# Patient Record
Sex: FEMALE | Race: WHITE | NOT HISPANIC OR LATINO | Employment: FULL TIME | URBAN - NONMETROPOLITAN AREA
[De-identification: names, ages, dates, MRNs, and addresses within clinical notes are randomized per-mention and may not be internally consistent; named-entity substitution may affect disease eponyms.]

---

## 2017-03-03 ENCOUNTER — COMMUNICATION - GICH (OUTPATIENT)
Dept: FAMILY MEDICINE | Facility: OTHER | Age: 42
End: 2017-03-03

## 2017-03-03 DIAGNOSIS — L40.9 PSORIASIS: ICD-10-CM

## 2017-04-11 ENCOUNTER — HISTORY (OUTPATIENT)
Dept: FAMILY MEDICINE | Facility: OTHER | Age: 42
End: 2017-04-11

## 2017-04-11 ENCOUNTER — COMMUNICATION - GICH (OUTPATIENT)
Dept: SURGERY | Facility: OTHER | Age: 42
End: 2017-04-11

## 2017-04-11 ENCOUNTER — OFFICE VISIT - GICH (OUTPATIENT)
Dept: FAMILY MEDICINE | Facility: OTHER | Age: 42
End: 2017-04-11

## 2017-04-11 DIAGNOSIS — Z00.00 ENCOUNTER FOR GENERAL ADULT MEDICAL EXAMINATION WITHOUT ABNORMAL FINDINGS: ICD-10-CM

## 2017-04-11 DIAGNOSIS — L40.9 PSORIASIS: ICD-10-CM

## 2017-04-11 DIAGNOSIS — Z80.0 FAMILY HISTORY OF MALIGNANT NEOPLASM OF DIGESTIVE ORGAN: ICD-10-CM

## 2017-04-11 LAB
CHOL/HDL RATIO - HISTORICAL: 2.07
CHOLESTEROL TOTAL: 149 MG/DL
HDLC SERPL-MCNC: 72 MG/DL (ref 23–92)
LDLC SERPL CALC-MCNC: 57 MG/DL
NON-HDL CHOLESTEROL - HISTORICAL: 77 MG/DL
PATIENT STATUS - HISTORICAL: NORMAL
TRIGL SERPL-MCNC: 100 MG/DL

## 2017-04-13 ENCOUNTER — HOSPITAL ENCOUNTER (OUTPATIENT)
Dept: RADIOLOGY | Facility: OTHER | Age: 42
End: 2017-04-13
Attending: FAMILY MEDICINE

## 2017-04-13 DIAGNOSIS — Z00.00 ENCOUNTER FOR GENERAL ADULT MEDICAL EXAMINATION WITHOUT ABNORMAL FINDINGS: ICD-10-CM

## 2017-04-18 ENCOUNTER — HISTORY (OUTPATIENT)
Dept: SURGERY | Facility: OTHER | Age: 42
End: 2017-04-18

## 2017-04-18 ENCOUNTER — OFFICE VISIT - GICH (OUTPATIENT)
Dept: SURGERY | Facility: OTHER | Age: 42
End: 2017-04-18

## 2017-04-18 ENCOUNTER — COMMUNICATION - GICH (OUTPATIENT)
Dept: SURGERY | Facility: OTHER | Age: 42
End: 2017-04-18

## 2017-04-18 DIAGNOSIS — Z80.0 FAMILY HISTORY OF MALIGNANT NEOPLASM OF DIGESTIVE ORGAN: ICD-10-CM

## 2017-04-18 LAB — HPV RESULTS - HISTORICAL: NEGATIVE

## 2017-05-17 ENCOUNTER — HISTORY (OUTPATIENT)
Dept: SURGERY | Facility: OTHER | Age: 42
End: 2017-05-17

## 2017-05-25 ENCOUNTER — SURGERY (OUTPATIENT)
Dept: SURGERY | Facility: OTHER | Age: 42
End: 2017-05-25

## 2017-06-29 ENCOUNTER — SURGERY (OUTPATIENT)
Dept: SURGERY | Facility: OTHER | Age: 42
End: 2017-06-29

## 2017-07-26 ENCOUNTER — COMMUNICATION - GICH (OUTPATIENT)
Dept: FAMILY MEDICINE | Facility: OTHER | Age: 42
End: 2017-07-26

## 2017-07-26 DIAGNOSIS — G44.019 EPISODIC CLUSTER HEADACHE, NOT INTRACTABLE: ICD-10-CM

## 2017-12-28 NOTE — TELEPHONE ENCOUNTER
Patient Information     Patient Name MRN Enid Toribio [id number] Female 1975      Telephone Encounter by Jackie Montalvo at 2017  9:38 AM     Author:  Jackie Montalvo Service:  (none) Author Type:  (none)     Filed:  2017  9:38 AM Encounter Date:  2017 Status:  Signed     :  Jackie Montalvo            Patient notified of rx  Jackie Montalvo LPN..............................2017  9:38 AM

## 2017-12-28 NOTE — TELEPHONE ENCOUNTER
Patient Information     Patient Name MRN Enid Toribio [id number] Female 1975      Telephone Encounter by Talisha King MD at 2017  9:01 AM     Author:  Talisha King MD Service:  (none) Author Type:  Physician     Filed:  2017  9:01 AM Encounter Date:  2017 Status:  Signed     :  Talisha King MD (Physician)            Imitrex prescription is sent.  Talisha King MD

## 2017-12-28 NOTE — TELEPHONE ENCOUNTER
Patient Information     Patient Name MRN Enid Toribio [id number] Female 1975      Telephone Encounter by Estefanía Wetzel at 2017  7:50 AM     Author:  Estefanía Wetzel Service:  (none) Author Type:  (none)     Filed:  2017  7:51 AM Encounter Date:  2017 Status:  Signed     :  Estefanía Wetzel            [facility] CALLED. CLUSTER HA BACK WORSE THAN EVER. WANTS TO SPEAK WITH YOU PRIOR TO SCHEDULING APPT. THANK YOU.  Estefanía Wetzel ....................  2017   7:51 AM

## 2017-12-28 NOTE — TELEPHONE ENCOUNTER
Patient Information     Patient Name MRN Enid Toribio [id number] Female 1975      Telephone Encounter by Jackie Montalvo at 2017  7:54 AM     Author:  Jackie Montalvo Service:  (none) Author Type:  (none)     Filed:  2017  7:57 AM Encounter Date:  2017 Status:  Signed     :  Jackie Montalvo            Patient states she began getting cluster headaches again when on vacation in [place]. Took ibuprofen, drank water when they first started which did help. States they are happening every 3-4 days. Woke up with one today. Had 3 yesterday. Does admit to increased stress. Wanted to reach out in case she should start the imitrex instead? Would rather not keep taking tylenol/ibuprofen every time.   Please advise.  Jackie Montalvo LPN..............................2017  7:57 AM

## 2018-01-03 NOTE — TELEPHONE ENCOUNTER
Patient Information     Patient Name MRN Enid Toribio [id number] Female 1975      Telephone Encounter by Trang Martinez RN at 3/3/2017 10:45 AM     Author:  Trang Martinez RN Service:  (none) Author Type:  NURS- Registered Nurse     Filed:  3/3/2017 11:18 AM Encounter Date:  3/3/2017 Status:  Signed     :  Trang Matrinez RN (NURS- Registered Nurse)            This is a Refill request from: [facility]  Name of Medication: betamethasone dipropionate 0.05% (DIPROLENE) 0.05 % ointment  Quantity requested: 45 grams  Last fill date: 17  Due for refill:   Last visit with TALISHA WHITMORE was on: 2013 in [facility]-over due for OV - letter sent  PCP:  Talisha Whitmore MD  Controlled Substance Agreement:  n/a   Diagnosis r/t this medication request: Psoriasis     Unable to complete prescription refill per RN Medication Refill Policy.................... Trang Martinez RN ....................  3/3/2017   11:14 AM

## 2018-01-04 NOTE — TELEPHONE ENCOUNTER
Patient Information     Patient Name MRN Enid Toribio [id number] Female 1975      Telephone Encounter by Laurie Martell MD at 2017  1:06 PM     Author:  Laurie Martell MD Service:  (none) Author Type:  Physician     Filed:  2017  1:06 PM Encounter Date:  2017 Status:  Signed     :  Laurie Martell MD (Physician)            Please set up consult with me. Thanks! Laurie Martell MD ....................  2017   1:06 PM

## 2018-01-04 NOTE — PROGRESS NOTES
Patient Information     Patient Name MRN Sex Enid Lin [id number] Female 1975      Progress Notes by Laurie Martell MD at 2017  8:20 AM     Author:  Laurie Martell MD Service:  (none) Author Type:  Physician     Filed:  2017 10:35 AM Encounter Date:  2017 Status:  Signed     :  Laurie Martell MD (Physician)            OFFICE CONSULTATION NOTE  Patient Name: Enid Arredondo  Address: [address] [place] [id number]  Age:42 y.o.  Sex: female     Primary Care Physician: Talisha King MD    I was requested to see this patient in consultation by Talisha King MD for because of a family history of colon cancer and colon poyps. A copy of this note will be sent to Talisha King MD.    HPI:   The patient is 42 y.o. female with questions about need for screening colonoscopy. She hasn't had any abdominal pain or changes in stools. The patient denies nausea, vomiting, constipation and diarrhea. No problems with urinating. She had a colonoscopy in  with no polyps noted. Her maternal grand mother had colon cancer. Her mother and sister (3 years older) have had precancerous polyps, but not cancer.    CONSULTATION ASSESSMENT AND PLAN/RECOMMENDATIONS:   I explained to the patient the risks, benefits and alternatives to screening colonoscopy for evaluating the colon in a patient with family history of colon cancer and colon polyps. We specifically discussed the risks of bleeding, infection, perforation, potential inability to reach the cecum and the risks of sedation. The patient's questions were answered and the patient wished to proceed.     REVIEW OF SYSTEMS  GENERAL: No fevers or chills. Denies fatigue, recent weight loss.  HEENT: No sinus drainage. No changes with vision or hearing. No difficulty swallowing.   LYMPHATICS:  No swollen nodes in axilla, neck or groin.  CARDIOVASCULAR: Denies chest pain, palpitations and dyspnea on  exertion.  PULMONARY: No shortness of breath or cough. No increase in sputum production.  GI: Denies melena, bright red blood in stools. No hematemesis. No constipation or diarrhea.  : No dysuria or hematuria.  SKIN: No recent rashes or ulcers.   HEMATOLOGY:  No history of easy bruising or bleeding.  ENDOCRINE:  No history of diabetes or thyroid problems.  NEUROLOGY:  No history of seizures or headaches. No motor or sensory changes.  PAST MEDICAL HISTORY  Past Medical History:     Diagnosis  Date     Cluster headaches      Hx of delivery     G4, para 3013 history of SAB 2006.       Psoriasis       PAST SURGICAL HISTORY  Past Surgical History:      Procedure  Laterality Date     COLONOSCOPY SCREENING  2010    normal, due 2020       REFRACTIVE SURGERY  2007    Lasik eye surgery        CURRENT MEDS  Current Outpatient Prescriptions on File Prior to Visit       Medication  Sig Dispense Refill     betamethasone dipropionate 0.05% (DIPROLENE) 0.05 % ointment Apply  topically to affected area(s) 2 times daily. 45 g 11     SUMAtriptan NASAL (IMITREX) 20 mg/actuation nasal spray Inhale 1 Spray in the nostril(s) every 2 hours if needed for Migraine. Max dose: 40mg per 24 hrs. 1 Bottle 5     No current facility-administered medications on file prior to visit.      ALLERGIES/SENSITIVITIES  No Known Allergies  FAMILY HISTORY  Family History       Problem   Relation Age of Onset     Cancer-colon  Maternal Grandmother      Colon cancer       Other  Maternal Grandmother      Alzheimer's       Cancer  Paternal Grandmother      Liver cancer       No Known Problems  Daughter      Good Health  Son      Colon polyps  Sister 34     Colon polyps diagnosed       Good Health  Other      Good Health  Daughter      Hypertension  Mother      Lives in [place]       Benign prostatic hyperplasia  Father      Lives in [place]       No Known Problems  Brother      1/2 sibling       No Known Problems  Brother      1/2 sibling       No Known  "Problems  Brother        - MVA        SOCIAL HISTORY  Social History     Social History        Marital status:       Spouse name: Brad     Number of children:  3     Years of education:  16     Occupational History       DAY CARE      Social History Main Topics         Smoking status:   Never Smoker     Smokeless tobacco:   Never Used     Alcohol use   No     Drug use:   No     Sexual activity:   Yes     Partners:  Male     Birth control/ protection:  Surgical      Comment: vas      Other Topics  Concern     Not on file      Social History Narrative     .  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      Brad  Spouse     Fariha  Daughter born 2009    Tariq Son born     Allison daughter               The above history was reviewed today.    PHYSICAL EXAM  /80  Ht 1.651 m (5' 5\")  Wt 73.6 kg (162 lb 4 oz)  LMP 2017  BMI 27 kg/m2    GENERAL: Healthy appearing patient in no acute distress. Pleasant and cooperative with exam and interview.   HEENT: Head-normocephalic. Eyes-no scleral icterus, pupils equal, round, and reactive to light. Nose-no nasal drainage. No lesions. Mouth-oral mucosa pink and moist, no lesions.  NECK: Supple. No thyroid nodules. Trachea midline.  LYMPHATICS:  No cervical, axillary or supraclavicular adenopathy.  CV: Regular rate and rhythm, no murmurs. No peripheral edema.  LUNGS:  No respiratory distress. Clear bilaterally to auscultation.  ABDOMEN: Non distended. Bowel sounds active. Soft, non-tender, no hepatosplenomegaly or umbilical hernia. No peritoneal signs.  SKIN: Pink, warm and dry. No jaundice. No rash.  NEURO:  Cranial nerves II-XII grossly intact. Alert and oriented.  PSYCH: Appropriate mood and affect.      Laurie Martell MD           "

## 2018-01-04 NOTE — NURSING NOTE
Patient Information     Patient Name MRN Enid Toribio [id number] Female 1975      Nursing Note by Claudette Kirk at 2017  8:20 AM     Author:  Claudette Kirk Service:  (none) Author Type:  (none)     Filed:  2017  8:40 AM Encounter Date:  2017 Status:  Signed     :  Claudette Kirk            Here today in consultation to discuss a colonoscopy.  Claudette Kirk LPN..........2017  8:37 AM

## 2018-01-04 NOTE — NURSING NOTE
Patient Information     Patient Name MRN Enid Toribio [id number] Female 1975      Nursing Note by Jackie Montalvo at 2017  8:45 AM     Author:  Jackie Montalvo Service:  (none) Author Type:  (none)     Filed:  2017  9:03 AM Encounter Date:  2017 Status:  Signed     :  Jackie Montalvo            Patient here for yearly physical. Wanting to discuss mammogram, colonoscopy and psoriasis.  Jackie Montalvo LPN..............................2017  8:56 AM

## 2018-01-04 NOTE — ADDENDUM NOTE
Patient Information     Patient Name MRN Sex Enid Lin [id number] Female 1975      Addendum Note by Laurie Mcleod at 2017  1:02 PM     Author:  Laurie Mcleod Service:  (none) Author Type:  (none)     Filed:  2017  1:02 PM Encounter Date:  2017 Status:  Signed     :  Laurie Mcleod       Addended by: LAURIE MCLEOD on: 2017 01:02 PM        Modules accepted: Orders

## 2018-01-04 NOTE — PROGRESS NOTES
Patient Information     Patient Name MRN Sex Enid Lin [id number] Female 1975      Progress Notes by Talisha King MD at 2017  8:45 AM     Author:  Talisha King MD Service:  (none) Author Type:  Physician     Filed:  2017 12:17 PM Encounter Date:  2017 Status:  Signed     :  Talisha King MD (Physician)            SUBJECTIVE:    Enid Arredondo is a 42 y.o. female who presehts for her annual exam.    HPI: Enid Arredondo is a 42 y.o. female presents for her annual exam.  She has psoriasis that involves her extremities and scalp. Currently, is being treated with topical steroid cream. She has been some tanning for this. She is wondering what her other treatment options could be, even though she is somewhat leery of using Biologics.    Last pap:   Immunizations:  Up to date   Mammogram - baseline to be scheduled  Colon cancer screening due to family history of colon polyps and colon cancer, she did have a colonoscopy done in .  Current birth control vasectomy   Last Lipids:  None on file.    PROBLEM LIST:  Patient Active Problem List     Diagnosis  Code     EPISODIC CLUSTER HEADACHE G44.019     PSORIASIS L40.9     NEVUS, ATYPICAL D23.9     Family history of colon cancer Z80.0     PAST MEDICAL HISTORY:  Past Medical History:     Diagnosis  Date     Cluster headaches      Hx of delivery     G4, para 3013 history of SAB 2006.       Psoriasis      SURGICAL HISTORY:  Past Surgical History:      Procedure  Laterality Date     COLONOSCOPY SCREENING      normal, due 2020       REFRACTIVE SURGERY      Lasik eye surgery         SOCIAL HISTORY:  Social History     Social History        Marital status:       Spouse name: Brad     Number of children:  3     Years of education:  16     Occupational History       [employer]ARE      Social History Main Topics         Smoking status:   Never Smoker     Smokeless tobacco:    Never Used     Alcohol use   No     Drug use:   No     Sexual activity:   Yes     Partners:  Male     Birth control/ protection:  Surgical      Comment: vas      Other Topics  Concern     Not on file      Social History Narrative     .  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      Brad  Spouse     Fariha  Daughter born 2009    Tariq Son born     Allison daughter 1-             FAMILY HISTORY:  Family History       Problem   Relation Age of Onset     Cancer-colon  Maternal Grandmother      Colon cancer       Other  Maternal Grandmother      Alzheimer's       Cancer  Paternal Grandmother      Liver cancer       No Known Problems  Daughter      Good Health  Son      Colon polyps  Sister 34     Colon polyps diagnosed       Good Health  Other      Good Health  Daughter      Hypertension  Mother      Lives in [place]       Benign prostatic hyperplasia  Father      Lives in [place]       No Known Problems  Brother      1/2 sibling       No Known Problems  Brother      1/2 sibling       No Known Problems  Brother        - MVA        CURRENT MEDICATIONS:   Current Outpatient Prescriptions       Medication  Sig Dispense Refill     betamethasone dipropionate 0.05% (DIPROLENE) 0.05 % ointment APPLY A THIN LAYER TO THE AFFECTED AREA TWICE DAILY AS NEEDED 45 g 0     mometasone (NASONEX) (50 mcg each actuation) nasal spray Inhale 2 Sprays into both nostrils once daily. 1 canister 4     SUMAtriptan NASAL (IMITREX) 20 mg/actuation nasal spray Inhale 1 Spray in the nostril(s) every 2 hours if needed for Migraine. Max dose: 40mg per 24 hrs. 1 Bottle 5     No current facility-administered medications for this visit.      Medications have been reviewed by me and are current to the best of my knowledge and ability.    ALLERGIES:  Review of patient's allergies indicates no known allergies.     REVIEW OF SYSTEMS:  General: denies any general problems.  Eyes: denies  "problems  Ears/Nose/Throat: denies problems, last dentist visit was last fall  Respiratory: denies problems  Cardiovascular: denies problems  Gastrointestinal: denies problems  Genitourinary: denies problems  Musculoskeletal: denies problems  Skin: Psoriasis  Neurologic: denies problems  Psychiatric: no headaches   Endocrine: denies problems  Heme/Lymphatic: denies problems  Allergic/Immunologic: denies problems    PHQ Depression Screening 4/11/2017   Date of PHQ exam (doc flow) 4/11/2017   1. Lack of interest/pleasure 0 - Not at all   2. Feeling down/depressed 0 - Not at all   PHQ-2 TOTAL SCORE 0      OBJECTIVE:  /80  Pulse 72  Ht 1.626 m (5' 4\")  Wt 73.3 kg (161 lb 9.6 oz)  LMP 03/27/2017  BMI 27.74 kg/m2  EXAM:   General Appearance: Pleasant, alert, appropriate appearance for age. No acute distress  Head Exam: Normal. Normocephalic, atraumatic.  Eye Exam:  Normal external eye, conjunctiva, lids, cornea. GABRIEL.  Ear Exam: Normal TM's bilaterally. Normal auditory canals and external ears. Non-tender.  Nose Exam: Normal external nose, mucus membranes, and septum.  OroPharynx Exam:  Dental hygiene adequate. Normal buccal mucose. Normal pharynx.  Neck Exam:  Supple, no masses or nodes.  Thyroid Exam: No nodules or enlargement.  Chest/Respiratory Exam: Normal chest wall and respirations. Clear to auscultation.  Breast Exam: No dimpling, nipple retraction or discharge. No masses or nodes.  Cardiovascular Exam: Regular rate and rhythm. S1, S2, no murmur, click, gallop, or rubs.  Gastrointestinal Exam: Soft, non-tender, no masses or organomegaly  Genitourinary Exam Female: External genitalia, vulva and vagina appear normal. Bimanual exam reveals normal uterus and adnexa, nontender urethra and bladder. Pap smear obtained   Lymphatic Exam: Non-palpable nodes in neck, clavicular, axillary, or inguinal regions.  Musculoskeletal Exam: Back is straight and non-tender, full ROM of upper and lower extremities.  Foot " Exam: Left and right foot: good pedal pulses, no lesions, nail hygiene good.  Skin: Erythema with scaling plaque on her elbows, knees, and up in her scalp.  Neurologic Exam: Nonfocal, symmetric DTRs, normal gross motor, tone coordination and no tremor.  Psychiatric Exam: Alert and oriented - appropriate affect.    Results for orders placed or performed in visit on 04/11/17       LIPID PANEL       Result  Value Ref Range Status    CHOLESTEROL,TOTAL 149 <200 mg/dL Final    TRIGLYCERIDES 100 <150 mg/dL Final    HDL CHOLESTEROL 72 23 - 92 mg/dL Final    NON-HDL CHOLESTEROL 77 <145 mg/dl Final    CHOL/HDL RATIO            2.07 <4.50                 Final    LDL CHOLESTEROL 57 <100 mg/dL Final    PATIENT STATUS            NOT GIVEN                 Final       ASSESSMENT/PLAN    ICD-10-CM    1. Physical exam, annual Z00.00 GYN THIN PREP PAP SCREEN IMAGED      XR MAMMO BILAT SCREENING      LIPID PANEL      LIPID PANEL      GYN THIN PREP PAP SCREEN IMAGED   2. Psoriasis L40.9 betamethasone dipropionate 0.05% (DIPROLENE) 0.05 % ointment      AMB CONSULT TO DERMATOLOGY   3. Family history of colon cancer Z80.0 COLONOSCOPY SCREENING-[facility]     Ms. Jaramillo There is no height or weight on file to calculate BMI. This is out of the normal range for a 42 y.o. Normal range for ages 18-64 is between 18.5 and 24.9; normal range for ages 65+ is 23-30. To lose weight we reviewed risks and benefits of appropriate options such as diet, exercise, and medications. Patient's strategy will be  self-directed nutrition plan and self-directed exercise program  BP Readings from Last 1 Encounters:05/12/15 : 120/78  Ms. Jaramillo blood pressure is out of the normal range for adults. Per JNC-8 guidelines normal adult blood pressure is < 120/80, pre-hypertensive is between 120/80 and 139/89, and hypertension is 140/90 or greater. Risks of hypertension were discussed. Patient's strategy will be to recheck blood pressure in 12 months    1. Pap  smear with HPV to be obtained today.  2. Baseline mammogram is ordered.  3. Referral for follow-up screening colonoscopy due to high risk family factors.  4. Normal lipid panel.  5. Dermatology consultation to be sent. This should include a thorough discussion of risks versus benefits of new or treatments for psoriasis    Talisha King MD

## 2018-01-04 NOTE — TELEPHONE ENCOUNTER
Patient Information     Patient Name MRN Enid Toribio [id number] Female 1975      Telephone Encounter by Louise Lopez at 2017 11:30 AM     Author:  Louise Lopez Service:  (none) Author Type:  (none)     Filed:  2017 11:33 AM Encounter Date:  2017 Status:  Signed     :  Louise Lopez            Patient referred by Dr. King for a screening colonoscopy .  She is 42 years old and has a family history of colon cancer . Please advise.  Thank you

## 2018-01-04 NOTE — H&P
Patient Information     Patient Name MRN Sex Enid Lin [id number] Female 1975      H&P by Laurie Martell MD at 2017  8:20 AM     Author:  Laurie Martell MD Service:  (none) Author Type:  Physician     Filed:  2017 10:35 AM Encounter Date:  2017 Status:  Signed     :  Laurie Martell MD (Physician)            OFFICE CONSULTATION NOTE  Patient Name: Enid Arredondo  Address: [address] [place] [id number]  Age:42 y.o.  Sex: female     Primary Care Physician: Talisha King MD    I was requested to see this patient in consultation by Talisha King MD for because of a family history of colon cancer and colon poyps. A copy of this note will be sent to Talisha King MD.    HPI:   The patient is 42 y.o. female with questions about need for screening colonoscopy. She hasn't had any abdominal pain or changes in stools. The patient denies nausea, vomiting, constipation and diarrhea. No problems with urinating. She had a colonoscopy in  with no polyps noted. Her maternal grand mother had colon cancer. Her mother and sister (3 years older) have had precancerous polyps, but not cancer.    CONSULTATION ASSESSMENT AND PLAN/RECOMMENDATIONS:   I explained to the patient the risks, benefits and alternatives to screening colonoscopy for evaluating the colon in a patient with family history of colon cancer and colon polyps. We specifically discussed the risks of bleeding, infection, perforation, potential inability to reach the cecum and the risks of sedation. The patient's questions were answered and the patient wished to proceed.     REVIEW OF SYSTEMS  GENERAL: No fevers or chills. Denies fatigue, recent weight loss.  HEENT: No sinus drainage. No changes with vision or hearing. No difficulty swallowing.   LYMPHATICS:  No swollen nodes in axilla, neck or groin.  CARDIOVASCULAR: Denies chest pain, palpitations and dyspnea on  exertion.  PULMONARY: No shortness of breath or cough. No increase in sputum production.  GI: Denies melena, bright red blood in stools. No hematemesis. No constipation or diarrhea.  : No dysuria or hematuria.  SKIN: No recent rashes or ulcers.   HEMATOLOGY:  No history of easy bruising or bleeding.  ENDOCRINE:  No history of diabetes or thyroid problems.  NEUROLOGY:  No history of seizures or headaches. No motor or sensory changes.  PAST MEDICAL HISTORY  Past Medical History:     Diagnosis  Date     Cluster headaches      Hx of delivery     G4, para 3013 history of SAB 2006.       Psoriasis       PAST SURGICAL HISTORY  Past Surgical History:      Procedure  Laterality Date     COLONOSCOPY SCREENING  2010    normal, due 2020       REFRACTIVE SURGERY  2007    Lasik eye surgery        CURRENT MEDS  Current Outpatient Prescriptions on File Prior to Visit       Medication  Sig Dispense Refill     betamethasone dipropionate 0.05% (DIPROLENE) 0.05 % ointment Apply  topically to affected area(s) 2 times daily. 45 g 11     SUMAtriptan NASAL (IMITREX) 20 mg/actuation nasal spray Inhale 1 Spray in the nostril(s) every 2 hours if needed for Migraine. Max dose: 40mg per 24 hrs. 1 Bottle 5     No current facility-administered medications on file prior to visit.      ALLERGIES/SENSITIVITIES  No Known Allergies  FAMILY HISTORY  Family History       Problem   Relation Age of Onset     Cancer-colon  Maternal Grandmother      Colon cancer       Other  Maternal Grandmother      Alzheimer's       Cancer  Paternal Grandmother      Liver cancer       No Known Problems  Daughter      Good Health  Son      Colon polyps  Sister 34     Colon polyps diagnosed       Good Health  Other      Good Health  Daughter      Hypertension  Mother      Lives in [place]       Benign prostatic hyperplasia  Father      Lives in [place]       No Known Problems  Brother      1/2 sibling       No Known Problems  Brother      1/2 sibling       No Known  "Problems  Brother        - MVA        SOCIAL HISTORY  Social History     Social History        Marital status:       Spouse name: Brad     Number of children:  3     Years of education:  16     Occupational History       [employer]      Social History Main Topics         Smoking status:   Never Smoker     Smokeless tobacco:   Never Used     Alcohol use   No     Drug use:   No     Sexual activity:   Yes     Partners:  Male     Birth control/ protection:  Surgical      Comment: vas      Other Topics  Concern     Not on file      Social History Narrative     .  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      Brad  Spouse     Fariha  Daughter born 2009    Tariq Son born     Allison daughter               The above history was reviewed today.    PHYSICAL EXAM  /80  Ht 1.651 m (5' 5\")  Wt 73.6 kg (162 lb 4 oz)  LMP 2017  BMI 27 kg/m2    GENERAL: Healthy appearing patient in no acute distress. Pleasant and cooperative with exam and interview.   HEENT: Head-normocephalic. Eyes-no scleral icterus, pupils equal, round, and reactive to light. Nose-no nasal drainage. No lesions. Mouth-oral mucosa pink and moist, no lesions.  NECK: Supple. No thyroid nodules. Trachea midline.  LYMPHATICS:  No cervical, axillary or supraclavicular adenopathy.  CV: Regular rate and rhythm, no murmurs. No peripheral edema.  LUNGS:  No respiratory distress. Clear bilaterally to auscultation.  ABDOMEN: Non distended. Bowel sounds active. Soft, non-tender, no hepatosplenomegaly or umbilical hernia. No peritoneal signs.  SKIN: Pink, warm and dry. No jaundice. No rash.  NEURO:  Cranial nerves II-XII grossly intact. Alert and oriented.  PSYCH: Appropriate mood and affect.      Laurie Martell MD           "

## 2018-01-04 NOTE — TELEPHONE ENCOUNTER
Patient Information     Patient Name MRN Enid Toribio [id number] Female 1975      Telephone Encounter by Louise Lopez at 2017  9:07 AM     Author:  Louise Lopez Service:  (none) Author Type:  (none)     Filed:  2017  9:12 AM Encounter Date:  2017 Status:  Signed     :  Louise Lopez            Screening Questions for the Scheduling of Screening Colonoscopies   (If Colonoscopy is diagnostic, Provider should review the chart before scheduling.)  Are you younger than 50 or older than 80?  YES   Do you take aspirin or fish oil?  NO (if yes, tell patient to stop 1 week prior to Colonoscopy)  Do you take warfarin (Coumadin), clopidogrel (Plavix), apixaban (Eliquis), dabigatram (Pradaxa), rivaroxaban (Xarelto) or any blood thinner? NO   Do you use oxygen at home?  NO   Do you have kidney disease? NO  Are you on dialysis? NO   Have you had a stroke or heart attack in the last year? NO  Have you had a stent in your heart or any blood vessel in the last year? NO   Have you had a transplant of any organ? NO  Have you had a colonoscopy or upper endoscopy (EGD) before? YES           When?    -  [facility]   Date of scheduled Colonoscopy. 2017  Provider [place]   [facility]  ([facility] )

## 2018-01-24 ENCOUNTER — DOCUMENTATION ONLY (OUTPATIENT)
Dept: FAMILY MEDICINE | Facility: OTHER | Age: 43
End: 2018-01-24

## 2018-01-24 PROBLEM — Z80.0 FAMILY HISTORY OF COLON CANCER: Status: ACTIVE | Noted: 2017-04-11

## 2018-01-24 RX ORDER — SUMATRIPTAN 20 MG/1
20 SPRAY NASAL
COMMUNITY
Start: 2017-07-26 | End: 2020-01-14

## 2018-01-24 RX ORDER — BETAMETHASONE DIPROPIONATE 0.5 MG/G
OINTMENT, AUGMENTED TOPICAL
COMMUNITY
Start: 2017-04-11 | End: 2018-05-16

## 2018-01-27 VITALS
SYSTOLIC BLOOD PRESSURE: 120 MMHG | HEART RATE: 72 BPM | BODY MASS INDEX: 27.59 KG/M2 | HEIGHT: 64 IN | WEIGHT: 161.6 LBS | DIASTOLIC BLOOD PRESSURE: 80 MMHG

## 2018-01-27 VITALS
WEIGHT: 162.25 LBS | BODY MASS INDEX: 27.03 KG/M2 | DIASTOLIC BLOOD PRESSURE: 80 MMHG | SYSTOLIC BLOOD PRESSURE: 120 MMHG | HEIGHT: 65 IN

## 2018-05-16 DIAGNOSIS — L40.9 PSORIASIS: Primary | ICD-10-CM

## 2018-05-16 NOTE — LETTER
May 22, 2018      Enid Arredondo  [address] [address]        Dear Enid,     This letter is to remind you that you are due for your annual exam with Talisha Hill. Your last comprehensive visit was more than 12 months ago.    A LIMITED refill of     betamethasone dipropionate 0.05% (DIPROLENE) 0.05 % ointment    has been called into your pharmacy. Additional refills require you to complete this appointment.    Please call the clinic at [phone] to schedule your appointment.    If you should require additional refills before your scheduled appointment, please contact your pharmacy and we will refill your medication until the date of your appointment.    If you are no longer seeing Talisha Hill for primary care, please call to let us know. Doing so will remove you from our call/contact list.      Thank you for choosing [facility] and [facility] for your health care needs.    Sincerely,    Refill MAINE  [facility]

## 2018-05-22 RX ORDER — BETAMETHASONE DIPROPIONATE 0.5 MG/G
OINTMENT, AUGMENTED TOPICAL
Qty: 45 G | Refills: 2 | Status: SHIPPED | OUTPATIENT
Start: 2018-05-22 | End: 2018-08-22

## 2018-05-22 NOTE — TELEPHONE ENCOUNTER
Last OV 4/11/17. Patient is due to be seen. Reminder letter sent to Patient. Prescription approved per [facility] Refill Protocol for 90-day fiona period at this time.     Ebony Duke RN .............. 5/22/2018  10:54 AM

## 2018-07-23 NOTE — PROGRESS NOTES
Patient Information     Patient Name  Enid Arredondo MRN  [id number] Sex  Female   1975      Letter by Talisha Sood MD at      Author:  Talisha Sood MD Service:  (none) Author Type:  (none)    Filed:   Encounter Date:  2017 Status:  (Other)           Enid Arredondo  [address] [address]          2017    Dear Ms. Arredondo:    Your pap smear and HPV results are normal.  Your next pap smear is due in 5 years.    Sincerely,  Talisha King MD [facility] 2017 10:25 AM

## 2018-07-24 NOTE — PROGRESS NOTES
Patient Information     Patient Name  Enid Arredondo MRN  [id number] Sex  Female   1975      Letter by Talisha Sood MD at      Author:  Talisha Sood MD Service:  (none) Author Type:  (none)    Filed:   Encounter Date:  2017 Status:  (Other)           Enid Arredondo  [address] [address]          2017    Dear Ms. Arredondo:    Here's a copy of your cholesterol panel:  Results for orders placed or performed in visit on 17      LIPID PANEL      Result  Value Ref Range    CHOLESTEROL,TOTAL 149 <200 mg/dL    TRIGLYCERIDES 100 <150 mg/dL    HDL CHOLESTEROL 72 23 - 92 mg/dL    NON-HDL CHOLESTEROL 77 <145 mg/dl    CHOL/HDL RATIO            2.07 <4.50                    LDL CHOLESTEROL 57 <100 mg/dL    PATIENT STATUS            NOT GIVEN                     These results are very good/normal.    Sincerely,  Talisha King MD [facility] 2017 4:13 PM

## 2018-08-22 ENCOUNTER — OFFICE VISIT (OUTPATIENT)
Dept: FAMILY MEDICINE | Facility: OTHER | Age: 43
End: 2018-08-22
Attending: FAMILY MEDICINE
Payer: COMMERCIAL

## 2018-08-22 VITALS
SYSTOLIC BLOOD PRESSURE: 118 MMHG | HEIGHT: 65 IN | DIASTOLIC BLOOD PRESSURE: 80 MMHG | WEIGHT: 163.4 LBS | BODY MASS INDEX: 27.22 KG/M2 | HEART RATE: 72 BPM

## 2018-08-22 DIAGNOSIS — Z12.31 ENCOUNTER FOR SCREENING MAMMOGRAM FOR BREAST CANCER: ICD-10-CM

## 2018-08-22 DIAGNOSIS — L40.9 PSORIASIS: ICD-10-CM

## 2018-08-22 DIAGNOSIS — Z80.0 FAMILY HISTORY OF COLON CANCER: ICD-10-CM

## 2018-08-22 DIAGNOSIS — Z00.00 PHYSICAL EXAM, ANNUAL: Primary | ICD-10-CM

## 2018-08-22 PROCEDURE — 99396 PREV VISIT EST AGE 40-64: CPT | Performed by: FAMILY MEDICINE

## 2018-08-22 RX ORDER — BETAMETHASONE DIPROPIONATE 0.5 MG/G
OINTMENT, AUGMENTED TOPICAL
Qty: 45 G | Refills: 11 | Status: SHIPPED | OUTPATIENT
Start: 2018-08-22 | End: 2020-01-14

## 2018-08-22 ASSESSMENT — PAIN SCALES - GENERAL: PAINLEVEL: NO PAIN (0)

## 2018-08-22 NOTE — PROGRESS NOTES
SUBJECTIVE:    Enid Arredondo is a 43 year old female who presents for her annual exam.    HPI: Enid Arredondo is a 43 year old female presents for her annual exam.    Last pap: 2017  Immunizations:  Due for tetanus  Mammogram at age 47  Colon cancer screening at age 47  Current birth control vasectomy     No results found for: CHOL  HDL Cholesterol   Date Value Ref Range Status   04/11/2017 72 23 - 92 mg/dL      LDL Cholesterol Calculated   Date Value Ref Range Status   04/11/2017 57 <100 mg/dL      Triglycerides   Date Value Ref Range Status   04/11/2017 100 <150 mg/dL            PROBLEM LIST:  Patient Active Problem List   Diagnosis     Episodic cluster headache     Family history of colon cancer     Psoriasis       PAST MEDICAL HISTORY:  Past Medical History:   Diagnosis Date     Cluster headache syndrome, not intractable     No Comments Provided     Psoriasis     No Comments Provided     Vaginal delivery     G4, para 3013 history of SAB 2006.     SURGICAL HISTORY:  Past Surgical History:   Procedure Laterality Date     COLONOSCOPY      2010,normal, due 2020     ENHANCE LASER REFRACTIVE NEW PT IN PARAMETERS      2007,Lasik eye surgery       SOCIAL HISTORY:  Social History     Social History     Marital status:      Spouse name: Brad     Number of children: 3     Years of education: 16     Occupational History      [employer]     Social History Main Topics     Smoking status: Never Smoker     Smokeless tobacco: Never Used     Alcohol use No     Drug use: No     Sexual activity: Yes     Partners: Male     Birth control/ protection: Surgical      Comment: Birth control method: vas     Other Topics Concern     Not on file     Social History Narrative    .  Runs in home  - closing summer 2017   works for [employer].    Brad  Spouse   Fariha  Daughter born 05/2009  Tariq Son born 2011  Allison daughter 1-2013     FAMILY HISTORY:    Family History   Problem  Relation Age of Onset     Colon Cancer Maternal Grandmother      Cancer-colon,Colon cancer     Other - See Comments Maternal Grandmother      Alzheimer's     Cancer Paternal Grandmother      Cancer,Liver cancer     No Known Problems Daughter      No Known Problems Son      No Known Problems Daughter      Hypertension Mother      Hypertension,Lives in [place]     Other - See Comments Father      Benign prostatic hyperplasia,Lives in [place]     Colon Polyps Sister 34     Colon polyps,Colon polyps diagnosed     No Known Problems Paternal Half-Brother      No Known Problems Brother      Other - See Comments Brother      No Known Problems,  - MVA       CURRENT MEDICATIONS:   Current Outpatient Prescriptions   Medication Sig Dispense Refill     augmented betamethasone dipropionate (DIPROLENE-AF) 0.05 % ointment APPLY TOPICALLY TO THE AFFECTED AREAS 2 TIMES DAILY 45 g 11     SUMAtriptan (IMITREX) 20 MG/ACT nasal spray Spray 20 mg in nostril every 2 hours as needed       ALLERGIES:   No Known Allergies      REVIEW OF SYSTEMS:  General: denies any general problems.  Eyes: denies problems  Ears/Nose/Throat: denies problems, last dentist visit was within the past year  Respiratory: denies problems  Cardiovascular: denies problems  Gastrointestinal: denies problems  Genitourinary: denies problems - periods regular  Musculoskeletal: denies problems  Skin: psoriasis, previous benign mole removal   Neurologic: did not get her cluster headaches this year  Psychiatric: denies problems  Endocrine: denies problems  Heme/Lymphatic: denies problems  Allergic/Immunologic: some increased seasonal allergies ; had some type of rash after [place]/tubing/chlorine    PHQ-2 Score:     PHQ-2 (  [employer]) 2018   Q1: Little interest or pleasure in doing things 0   Q2: Feeling down, depressed or hopeless 0   PHQ-2 Score 0         OBJECTIVE:  /80 (BP Location: Right arm, Patient Position: Sitting, Cuff Size: Adult Regular)   "Pulse 72  Ht 5' 4.5\" (1.638 m)  Wt 163 lb 6.4 oz (74.1 kg)  LMP 08/19/2018  BMI 27.61 kg/m2   EXAM:   General Appearance: Pleasant, alert, appropriate appearance for age. No acute distress  Head Exam: Normal. Normocephalic, atraumatic.  Eye Exam:Normal external eye, conjunctiva, lids, cornea. GABRIEL.  Ear Exam: Normal TM's bilaterally. Normal auditory canals and external ears. Non-tender.  Nose Exam: Normal external nose, mucus membranes, and septum.  OroPharynx Exam:  Dental hygiene adequate. Normal buccal mucose. Normal pharynx.  Neck Exam:  Supple, no masses or nodes.  Thyroid Exam: No nodules or enlargement.  Chest/Respiratory Exam: Normal chest wall andrespirations. Clear to auscultation.  Breast Exam: No dimpling, nipple retraction or discharge. No masses or nodes.  Cardiovascular Exam: Regular rate and rhythm. S1, S2, no murmur, click, gallop, or rubs.  Gastrointestinal Exam: Soft, non-tender, no masses or organomegaly; gas bubble right LQ  Musculoskeletal Exam: Back is straight and non-tender, full ROM of upper and lower extremities.  Foot Exam: Left andright foot: good pedal pulses, no lesions, nail hygiene good.  Skin: psoriasis, elbows and knees   Neurologic Exam: Nonfocal, symmetric DTRs, normal gross motor, tone coordination and no tremor.  Psychiatric Exam: Alertand oriented - appropriate affect.    Office Visit - [facility] on 04/11/2017   Component Date Value Ref Range Status     Cholesterol Total 04/11/2017 149  <200 mg/dL Final     Triglycerides 04/11/2017 100  <150 mg/dL Final     LDL Cholesterol Calculated 04/11/2017 57  <100 mg/dL Final     HDL Cholesterol 04/11/2017 72  23 - 92 mg/dL Final     Non-HDL Cholesterol - Historical 04/11/2017 77  <145 mg/dL Final     Cholesterol/HDL Ratio - Historical 04/11/2017 2.07  <4.50 Final     Patient Status - Historical 04/11/2017 NOT GIVEN   Final     HPV Results - [facility] Historical 04/18/2017 Negative  Negative Final    HPV types 16, 18, 31, 33, 35, 39, 45, 51, " 52, 56, 58, 59, 66 and 68 DNA were undetectable or below the pre-set threshold.       ASSESSMENT/PLAN    ICD-10-CM    1. Physical exam, annual Z00.00    2. Psoriasis L40.9 augmented betamethasone dipropionate (DIPROLENE-AF) 0.05 % ointment   3. Family history of colon cancer Z80.0    4. Encounter for screening mammogram for breast cancer Z12.31 MA Screen Bilateral w/Shawn     1.  Mammogram ordered.  2.  Colonoscopy at 47 -she did have a colonoscopy done in 2010, this was normal, no polyps noted at that time.  3.  Diprolene re-ordered   -discussed dermatology referral if/when she wishes to discuss biologic medication for psoriasis.  4.  Flu vaccine recommended this fall.      Talisha King MD

## 2018-08-22 NOTE — NURSING NOTE
"Patient presents in the clinic for a physical, no noted concerns at this time.   Lisy Mills LPN 8/22/2018 1:22 PM  Chief Complaint   Patient presents with     Physical       Initial /80 (BP Location: Right arm, Patient Position: Sitting, Cuff Size: Adult Regular)  Pulse 72  Ht 5' 4.5\" (1.638 m)  Wt 163 lb 6.4 oz (74.1 kg)  LMP 08/19/2018  BMI 27.61 kg/m2 Estimated body mass index is 27.61 kg/(m^2) as calculated from the following:    Height as of this encounter: 5' 4.5\" (1.638 m).    Weight as of this encounter: 163 lb 6.4 oz (74.1 kg).  Medication Reconciliation: complete    Jade Mills LPN    "

## 2018-08-22 NOTE — MR AVS SNAPSHOT
"              After Visit Summary   8/22/2018    Enid Arredondo    MRN: [id number]           Patient Information     Date Of Birth          1975        Visit Information        Provider Department      8/22/2018 1:45 PM Talisha Hill MD [facility]        Today's Diagnoses     Physical exam, annual    -  1    Psoriasis        Family history of colon cancer        Encounter for screening mammogram for breast cancer           Follow-ups after your visit        Future tests that were ordered for you today     Open Future Orders        Priority Expected Expires Ordered    MA Screen Bilateral w/Shawn Routine  8/22/2019 8/22/2018            Who to contact     If you have questions or need follow up information about today's clinic visit or your schedule please contact [facility] directly at [phone].  Normal or non-critical lab and imaging results will be communicated to you by MyChart, letter or phone within 4 business days after the clinic has received the results. If you do not hear from us within 7 days, please contact the clinic through MyChart or phone. If you have a critical or abnormal lab result, we will notify you by phone as soon as possible.  Submit refill requests through [employer] or call your pharmacy and they will forward the refill request to us. Please allow 3 business days for your refill to be completed.          Additional Information About Your Visit        Care EveryWhere ID     This is your Care EveryWhere ID. This could be used by other organizations to access your [place] medical records  [id number]        Your Vitals Were     Pulse Height Last Period BMI (Body Mass Index)          72 5' 4.5\" (1.638 m) 08/19/2018 27.61 kg/m2         Blood Pressure from Last 3 Encounters:   08/22/18 118/80   04/18/17 120/80   04/11/17 120/80    Weight from Last 3 Encounters:   08/22/18 163 lb 6.4 oz (74.1 kg)   04/18/17 162 lb 4 oz (73.6 kg) "   04/11/17 161 lb 9.6 oz (73.3 kg)                 Where to get your medicines      These medications were sent to [place] [id number] [facility] [place], [place] - [address]  [address] [facility] [address]     Phone:  [phone]     augmented betamethasone dipropionate 0.05 % ointment          Primary Care Provider Office Phone # Fax #    Talisha TAYLOR Zay Crowell [phone]       [address]  [facility] [address]        Equal Access to Services     [facility]: Hadii aad ku hadasho Soomaali, waaxda luqadaha, qaybta kaalmada adeegyada, ignacio merrill . So [facility] [phone].    ATENCIÓN: Si habla español, tiene a boswell disposición servicios gratuitos de asistencia lingüística. Ll[facility] [phone].    We comply with applicable federal civil rights laws and [place] laws. We do not discriminate on the basis of race, color, national origin, age, disability, sex, sexual orientation, or gender identity.            Thank you!     Thank you for choosing [facility]  for your care. Our goal is always to provide you with excellent care. Hearing back from our patients is one way we can continue to improve our services. Please take a few minutes to complete the written survey that you may receive in the mail after your visit with us. Thank you!             Your Updated Medication List - Protect others around you: Learn how to safely use, store and throw away your medicines at www.disposemymeds.org.          This list is accurate as of 8/22/18  2:44 PM.  Always use your most recent med list.                   Brand Name Dispense Instructions for use Diagnosis    augmented betamethasone dipropionate 0.05 % ointment    DIPROLENE-AF    45 g    APPLY TOPICALLY TO THE AFFECTED AREAS 2 TIMES DAILY    Psoriasis       SUMAtriptan 20 MG/ACT nasal spray    IMITREX     Spray 20 mg in nostril every 2 hours as needed

## 2018-08-23 ENCOUNTER — HOSPITAL ENCOUNTER (OUTPATIENT)
Dept: MAMMOGRAPHY | Facility: OTHER | Age: 43
Discharge: HOME | End: 2018-08-23
Attending: FAMILY MEDICINE | Admitting: FAMILY MEDICINE
Payer: COMMERCIAL

## 2018-08-23 DIAGNOSIS — Z12.31 ENCOUNTER FOR SCREENING MAMMOGRAM FOR BREAST CANCER: ICD-10-CM

## 2018-08-23 PROCEDURE — 77063 BREAST TOMOSYNTHESIS BI: CPT

## 2019-06-12 DIAGNOSIS — L40.9 PSORIASIS: ICD-10-CM

## 2019-06-14 RX ORDER — BETAMETHASONE DIPROPIONATE 0.5 MG/G
OINTMENT, AUGMENTED TOPICAL
Qty: 45 G | Refills: 0 | Status: SHIPPED | OUTPATIENT
Start: 2019-06-14 | End: 2019-10-07

## 2019-06-14 NOTE — TELEPHONE ENCOUNTER
"Requested Prescriptions   Pending Prescriptions Disp Refills     augmented betamethasone dipropionate (DIPROLENE-AF) 0.05 % external ointment [Pharmacy Med Name: BETAMETHASONE DP AUG 0.05% OIN] 45 g 1     Sig: APPLY TOPICALLY TO THE AFFECTED AREAS 2 TIMES DAILY       Topical Steroids and Nonsteroidals Protocol Passed - 6/12/2019 10:42 AM        Passed - Patient is age 6 or older        Passed - Authorizing prescriber's most recent note related to this medication read.     If refill request is for ophthalmic use, please forward request to provider for approval.          Passed - High potency steroid not ordered        Passed - Recent (12 mo) or future (30 days) visit within the authorizing provider's specialty     Patient had office visit in the last 12 months or has a visit in the next 30 days with authorizing provider or within the authorizing provider's specialty.  See \"Patient Info\" tab in inbasket, or \"Choose Columns\" in Meds & Orders section of the refill encounter.              Passed - Medication is active on med list        LOV 8/22/18    2 month supply filled per protocol.  "

## 2019-10-07 DIAGNOSIS — L40.9 PSORIASIS: ICD-10-CM

## 2019-10-07 NOTE — LETTER
October 10, 2019      Enid Arredondo  [address] [address]        Dear Enid,     A refill of Augmented betamethasone dipropionate 0.05% external ointment has been requested by your pharmacy.  We noticed that it has been greater than 12 months since your last comprehensive visit and labs with Dr. Zay Arias.  A limited 90 day supply has been sent to your pharmacy at this time.    Additional refills require a medication management appointment.  Your health is very important to us.  Please call the clinic at [phone] to schedule your appointment.    Thank you,    The Refill Nurse  [facility]               Sincerely,        AZAM ARIAS MD

## 2019-10-10 RX ORDER — BETAMETHASONE DIPROPIONATE 0.5 MG/G
OINTMENT, AUGMENTED TOPICAL
Qty: 45 G | Refills: 0 | Status: SHIPPED | OUTPATIENT
Start: 2019-10-10 | End: 2020-01-14

## 2019-10-10 NOTE — TELEPHONE ENCOUNTER
[facility] request for the following:      Augmented betamethasone dipropionate 0.05% external ointment      Last Prescription Date:   6/14/19  Last Fill Qty/Refills:         45 g, R-0    Last Office Visit:              8/22/18   Future Office visit:           None    Pt due for annual visit.  Angie refill sent and reminder letter sent to pt.    Ebony Morrissey RN............................ 10/10/2019 9:42 AM

## 2020-01-13 SDOH — ECONOMIC STABILITY: TRANSPORTATION INSECURITY
IN THE PAST 12 MONTHS, HAS THE LACK OF TRANSPORTATION KEPT YOU FROM MEDICAL APPOINTMENTS OR FROM GETTING MEDICATIONS?: NO

## 2020-01-13 SDOH — ECONOMIC STABILITY: INCOME INSECURITY: HOW HARD IS IT FOR YOU TO PAY FOR THE VERY BASICS LIKE FOOD, HOUSING, MEDICAL CARE, AND HEATING?: NOT HARD AT ALL

## 2020-01-13 SDOH — ECONOMIC STABILITY: TRANSPORTATION INSECURITY
IN THE PAST 12 MONTHS, HAS LACK OF TRANSPORTATION KEPT YOU FROM MEETINGS, WORK, OR FROM GETTING THINGS NEEDED FOR DAILY LIVING?: NO

## 2020-01-13 SDOH — ECONOMIC STABILITY: FOOD INSECURITY: WITHIN THE PAST 12 MONTHS, THE FOOD YOU BOUGHT JUST DIDN'T LAST AND YOU DIDN'T HAVE MONEY TO GET MORE.: NEVER TRUE

## 2020-01-13 SDOH — ECONOMIC STABILITY: FOOD INSECURITY: WITHIN THE PAST 12 MONTHS, YOU WORRIED THAT YOUR FOOD WOULD RUN OUT BEFORE YOU GOT MONEY TO BUY MORE.: NEVER TRUE

## 2020-01-13 NOTE — PROGRESS NOTES
SUBJECTIVE:  Nursing Notes:   Bere Jimneez LPN  1/14/2020 11:33 AM  Signed  Patient presents to the clinic today for a px. Med rec complete. Bere Jimenez LPN.................. 1/14/2020 11:17 AM    Enid Arredondo is a 44 year old female who presents for her annual exam.    HPI: Enid Arredondo is a 44 year old female presents for her annual exam.    Psoriasis:  Uses topical steroids and ointment for this.  She has also tried eating gluten free - has this maybe every other day.      Last pap: 2017  Immunizations:  Due for tetanus and flu vaccines  Mammogram  - 8/23/18  Colon cancer screening at age 47  Current birth control vasectomy     No results found for: CHOL  HDL Cholesterol   Date Value Ref Range Status   04/11/2017 72 23 - 92 mg/dL      LDL Cholesterol Calculated   Date Value Ref Range Status   04/11/2017 57 <100 mg/dL      Triglycerides   Date Value Ref Range Status   04/11/2017 100 <150 mg/dL            PROBLEM LIST:  Patient Active Problem List   Diagnosis     Episodic cluster headache     Family history of colon cancer     Psoriasis       PAST MEDICAL HISTORY:  Past Medical History:   Diagnosis Date     Cluster headache syndrome, not intractable     No Comments Provided     Psoriasis     No Comments Provided     Vaginal delivery     G4, para 3013 history of SAB 2006.     SURGICAL HISTORY:  Past Surgical History:   Procedure Laterality Date     COLONOSCOPY      2010,normal, due 2020     ENHANCE LASER REFRACTIVE NEW PT IN PARAMETERS      2007,Lasik eye surgery       SOCIAL HISTORY:  Social History     Socioeconomic History     Marital status:      Spouse name: Brad     Number of children: 3     Years of education: 16     Highest education level: Bachelor's degree (e.g., BA, AB, BS)   Occupational History     Occupation:      Employer: [id number]   Social Needs     Financial resource strain: Not hard at all     Food insecurity:     Worry: Never true     Inability: Never  true     Transportation needs:     Medical: No     Non-medical: No   Tobacco Use     Smoking status: Never Smoker     Smokeless tobacco: Never Used   Substance and Sexual Activity     Alcohol use: Yes     Drug use: No     Sexual activity: Yes     Partners: Male     Birth control/protection: Surgical     Comment: Birth control method: vas   Lifestyle     Physical activity:     Days per week: Not on file     Minutes per session: Not on file     Stress: Not on file   Relationships     Social connections:     Talks on phone: Not on file     Gets together: Not on file     Attends [employer]ology service: Not on file     Active member of club or organization: Not on file     Attends meetings of clubs or organizations: Not on file     Relationship status: Not on file     Intimate partner violence:     Fear of current or ex partner: Not on file     Emotionally abused: Not on file     Physically abused: Not on file     Forced sexual activity: Not on file   Other Topics Concern     Parent/sibling w/ CABG, MI or angioplasty before 65F 55M? Not Asked   Social History Narrative    .  Works for [employer], .      works for [employer].    Brad  Spouse       Fariha  Daughter born 05/2009  Tariq Son born 2011  Allison daughter 1-2013     FAMILY HISTORY:    Family History   Problem Relation Age of Onset     Colon Cancer Maternal Grandmother         Cancer-colon,Colon cancer     Other - See Comments Maternal Grandmother         Alzheimer's     Cancer Paternal Grandmother         Cancer,Liver cancer     No Known Problems Daughter      No Known Problems Son      No Known Problems Daughter      Hypertension Mother         Hypertension,Lives in [place]     Other - See Comments Father         Benign prostatic hyperplasia,Lives in [place]     Colon Polyps Sister 34        Colon polyps,Colon polyps diagnosed     No Known Problems Paternal Half-Brother      No Known Problems Brother      Other - See Comments Brother      "    No Known Problems,  - MVA       CURRENT MEDICATIONS:   Current Outpatient Medications   Medication Sig Dispense Refill     augmented betamethasone dipropionate (DIPROLENE-AF) 0.05 % external ointment APPLY TOPICALLY TO THE AFFECTED AREAS 2 TIMES DAILY 45 g 0     ALLERGIES:   No Known Allergies      REVIEW OF SYSTEMS:  General: denies any general problems. She sleeps pretty well  She has to do her crunches before having her coffee; she goes to [employer] when she can   Eyes: denies problems  Ears/Nose/Throat: denies problems, last dentist visit was within the past year  Respiratory: denies problems  Cardiovascular: denies problems  Gastrointestinal: denies problems  Genitourinary: denies problems - periods regular  Musculoskeletal: denies problems  Skin: psoriasis, previous benign mole removal   Neurologic: did not get her cluster headaches this year -   Psychiatric: denies problems  Endocrine: denies problems  Heme/Lymphatic: denies problems  Allergic/Immunologic: some increased seasonal allergies    PHQ-2 Score:     PHQ-2 (  [employer]) 2020   Q1: Little interest or pleasure in doing things 0 0   Q2: Feeling down, depressed or hopeless 0 0   PHQ-2 Score 0 0         OBJECTIVE:  /72   Pulse 76   Temp 98.2  F (36.8  C) (Tympanic)   Resp 16   Ht (P) 1.638 m (5' 4.5\")   Wt 73 kg (161 lb)   LMP 2020   Breastfeeding No   BMI (P) 27.21 kg/m     EXAM:   General Appearance: Pleasant, alert, appropriate appearance for age. No acute distress  Head Exam: Normal. Normocephalic, atraumatic.  Eye Exam:Normal external eye, conjunctiva, lids, cornea. GABRIEL.  Ear Exam: Normal TM's bilaterally. Normal auditory canals and external ears. Non-tender.  Nose Exam: Normal external nose, mucus membranes, and septum.  OroPharynx Exam:  Dental hygiene adequate. Normal buccal mucose. Normal pharynx.  Neck Exam:  Supple, no masses or nodes.  Thyroid Exam: No nodules or " enlargement.  Chest/Respiratory Exam: Normal chest wall andrespirations. Clear to auscultation.  Breast Exam: No dimpling, nipple retraction or discharge. No masses or nodes.  Cardiovascular Exam: Regular rate and rhythm. S1, S2, no murmur, click, gallop, or rubs.  Gastrointestinal Exam: Soft, non-tender, no masses or organomegaly; gas bubble right LQ  Musculoskeletal Exam: Back is straight and non-tender, full ROM of upper and lower extremities.  Foot Exam: Left andright foot: good pedal pulses, no lesions, nail hygiene good.  Skin: psoriasis, elbows and knees  - scalp also checked.  Neurologic Exam: Nonfocal, symmetric DTRs, normal gross motor, tone coordination and no tremor.  Psychiatric Exam: Alertand oriented - appropriate affect.    Office Visit - [facility] on 04/11/2017   Component Date Value Ref Range Status     Cholesterol Total 04/11/2017 149  <200 mg/dL Final     Triglycerides 04/11/2017 100  <150 mg/dL Final     LDL Cholesterol Calculated 04/11/2017 57  <100 mg/dL Final     HDL Cholesterol 04/11/2017 72  23 - 92 mg/dL Final     Non-HDL Cholesterol - Historical 04/11/2017 77  <145 mg/dL Final     Cholesterol/HDL Ratio - Historical 04/11/2017 2.07  <4.50 Final     Patient Status - Historical 04/11/2017 NOT GIVEN   Final     HPV Results - [facility] Historical 04/18/2017 Negative  Negative Final    HPV types 16, 18, 31, 33, 35, 39, 45, 51, 52, 56, 58, 59, 66 and 68 DNA were undetectable or below the pre-set threshold.       ASSESSMENT/PLAN    ICD-10-CM    1. Physical exam, annual Z00.00    2. Psoriasis L40.9 augmented betamethasone dipropionate (DIPROLENE-AF) 0.05 % external ointment   3. Family history of colon cancer Z80.0      1.  Mammogram ordered.  2.  Colonoscopy discussed given positive FH.  3.  Diprolene re-ordered   -discussed dermatology referral if/when she wishes to discuss biologic medication for psoriasis.  4.  Flu vaccine updated. ; boostrix updated      Talisha King MD

## 2020-01-14 ENCOUNTER — OFFICE VISIT (OUTPATIENT)
Dept: FAMILY MEDICINE | Facility: OTHER | Age: 45
End: 2020-01-14
Attending: FAMILY MEDICINE
Payer: COMMERCIAL

## 2020-01-14 VITALS
HEART RATE: 76 BPM | TEMPERATURE: 98.2 F | DIASTOLIC BLOOD PRESSURE: 72 MMHG | RESPIRATION RATE: 16 BRPM | BODY MASS INDEX: 27.21 KG/M2 | WEIGHT: 161 LBS | SYSTOLIC BLOOD PRESSURE: 124 MMHG

## 2020-01-14 DIAGNOSIS — Z00.00 PHYSICAL EXAM, ANNUAL: Primary | ICD-10-CM

## 2020-01-14 DIAGNOSIS — Z80.0 FAMILY HISTORY OF COLON CANCER: ICD-10-CM

## 2020-01-14 DIAGNOSIS — L40.9 PSORIASIS: ICD-10-CM

## 2020-01-14 PROCEDURE — 90686 IIV4 VACC NO PRSV 0.5 ML IM: CPT | Performed by: FAMILY MEDICINE

## 2020-01-14 PROCEDURE — 90472 IMMUNIZATION ADMIN EACH ADD: CPT | Performed by: FAMILY MEDICINE

## 2020-01-14 PROCEDURE — 99396 PREV VISIT EST AGE 40-64: CPT | Mod: 25 | Performed by: FAMILY MEDICINE

## 2020-01-14 PROCEDURE — 90715 TDAP VACCINE 7 YRS/> IM: CPT | Performed by: FAMILY MEDICINE

## 2020-01-14 PROCEDURE — 90471 IMMUNIZATION ADMIN: CPT | Performed by: FAMILY MEDICINE

## 2020-01-14 RX ORDER — BETAMETHASONE DIPROPIONATE 0.5 MG/G
OINTMENT, AUGMENTED TOPICAL
Qty: 50 G | Refills: 11 | Status: SHIPPED | OUTPATIENT
Start: 2020-01-14 | End: 2021-03-16

## 2020-01-14 ASSESSMENT — PAIN SCALES - GENERAL: PAINLEVEL: NO PAIN (0)

## 2020-01-14 ASSESSMENT — MIFFLIN-ST. JEOR: SCORE: 1383.23

## 2020-01-14 NOTE — NURSING NOTE
Patient presents to the clinic today for a px. Med rec complete. Bere Jimenez LPN.................. 1/14/2020 11:17 AM

## 2020-01-15 ENCOUNTER — TELEPHONE (OUTPATIENT)
Dept: SURGERY | Facility: OTHER | Age: 45
End: 2020-01-15

## 2020-01-15 NOTE — TELEPHONE ENCOUNTER
Patient referred by Dr. King for a colonoscopy ,  She is 44 with a family history of colon cancer.  Please advise.  Thank you. . Louise Lopez on 1/15/2020 at 9:49 AM

## 2020-01-20 ENCOUNTER — TELEPHONE (OUTPATIENT)
Dept: FAMILY MEDICINE | Facility: OTHER | Age: 45
End: 2020-01-20

## 2020-01-20 NOTE — TELEPHONE ENCOUNTER
Patient has been called and messages left to schedule a colonoscopy on 01/15, 01/16 and 01/17.   Letter has been sent out today for her to call and schedule.  Louise Lopez on 1/20/2020 at 9:50 AM

## 2020-02-03 ENCOUNTER — HOSPITAL ENCOUNTER (OUTPATIENT)
Dept: MAMMOGRAPHY | Facility: OTHER | Age: 45
Discharge: HOME | End: 2020-02-03
Attending: FAMILY MEDICINE | Admitting: FAMILY MEDICINE
Payer: COMMERCIAL

## 2020-02-03 DIAGNOSIS — Z12.31 VISIT FOR SCREENING MAMMOGRAM: ICD-10-CM

## 2020-02-03 DIAGNOSIS — Z00.00 PHYSICAL EXAM, ANNUAL: ICD-10-CM

## 2020-02-03 PROCEDURE — 77063 BREAST TOMOSYNTHESIS BI: CPT

## 2020-02-05 ENCOUNTER — HOSPITAL ENCOUNTER (OUTPATIENT)
Facility: OTHER | Age: 45
End: 2020-02-05
Attending: SURGERY | Admitting: SURGERY
Payer: COMMERCIAL

## 2020-02-05 DIAGNOSIS — Z12.11 SPECIAL SCREENING FOR MALIGNANT NEOPLASMS, COLON: Primary | ICD-10-CM

## 2020-02-05 RX ORDER — BISACODYL 5 MG/1
TABLET, DELAYED RELEASE ORAL
Qty: 2 TABLET | Refills: 0 | Status: ON HOLD | OUTPATIENT
Start: 2020-02-05 | End: 2020-07-01

## 2020-02-05 RX ORDER — POLYETHYLENE GLYCOL 3350, SODIUM CHLORIDE, SODIUM BICARBONATE, POTASSIUM CHLORIDE 420; 11.2; 5.72; 1.48 G/4L; G/4L; G/4L; G/4L
4000 POWDER, FOR SOLUTION ORAL ONCE
Qty: 4000 ML | Refills: 0 | Status: ON HOLD | OUTPATIENT
Start: 2020-02-05 | End: 2020-07-01

## 2020-02-05 NOTE — TELEPHONE ENCOUNTER
Screening Questions for the Scheduling of Screening Colonoscopies   (If Colonoscopy is diagnostic, Provider should review the chart before scheduling.)  Are you younger than 50 or older than 80?  YES   Do you take aspirin or fish oil?  NO  (if yes, tell patient to stop 1 week prior to Colonoscopy)  Do you take warfarin (Coumadin), clopidogrel (Plavix), apixaban (Eliquis), dabigatram (Pradaxa), rivaroxaban (Xarelto) or any blood thinner? NO   Do you use oxygen at home?  NO   Do you have kidney disease? NO   Are you on dialysis? NO   Have you had a stroke or heart attack in the last year? NO   Have you had a stent in your heart or any blood vessel in the last year? NO   Have you had a transplant of any organ? NO   Have you had a colonoscopy or upper endoscopy (EGD) before? YES          When?  7 YRS AGO ?  Date of scheduled Colonoscopy. 04/03/2020  Provider MARIANO   Pharmacy [facility]

## 2020-06-24 DIAGNOSIS — Z01.818 PRE-OP TESTING: Primary | ICD-10-CM

## 2020-06-28 ENCOUNTER — ALLIED HEALTH/NURSE VISIT (OUTPATIENT)
Dept: FAMILY MEDICINE | Facility: OTHER | Age: 45
End: 2020-06-28
Attending: SURGERY
Payer: COMMERCIAL

## 2020-06-28 DIAGNOSIS — Z01.818 PRE-OP TESTING: ICD-10-CM

## 2020-06-28 PROCEDURE — U0003 INFECTIOUS AGENT DETECTION BY NUCLEIC ACID (DNA OR RNA); SEVERE ACUTE RESPIRATORY SYNDROME CORONAVIRUS 2 (SARS-COV-2) (CORONAVIRUS DISEASE [COVID-19]), AMPLIFIED PROBE TECHNIQUE, MAKING USE OF HIGH THROUGHPUT TECHNOLOGIES AS DESCRIBED BY CMS-2020-01-R: HCPCS | Mod: ZL | Performed by: SURGERY

## 2020-06-28 PROCEDURE — C9803 HOPD COVID-19 SPEC COLLECT: HCPCS

## 2020-06-29 LAB
SARS-COV-2 PCR COMMENT: NORMAL
SARS-COV-2 RNA SPEC QL NAA+PROBE: NEGATIVE
SARS-COV-2 RNA SPEC QL NAA+PROBE: NORMAL
SPECIMEN SOURCE: NORMAL
SPECIMEN SOURCE: NORMAL

## 2020-07-01 ENCOUNTER — ANESTHESIA (OUTPATIENT)
Dept: SURGERY | Facility: OTHER | Age: 45
End: 2020-07-01
Payer: COMMERCIAL

## 2020-07-01 ENCOUNTER — HOSPITAL ENCOUNTER (OUTPATIENT)
Facility: OTHER | Age: 45
Discharge: HOME | End: 2020-07-01
Attending: SURGERY | Admitting: SURGERY
Payer: COMMERCIAL

## 2020-07-01 ENCOUNTER — ANESTHESIA EVENT (OUTPATIENT)
Dept: SURGERY | Facility: OTHER | Age: 45
End: 2020-07-01
Payer: COMMERCIAL

## 2020-07-01 VITALS
DIASTOLIC BLOOD PRESSURE: 57 MMHG | BODY MASS INDEX: 27.31 KG/M2 | SYSTOLIC BLOOD PRESSURE: 113 MMHG | HEIGHT: 64 IN | RESPIRATION RATE: 18 BRPM | WEIGHT: 160 LBS | OXYGEN SATURATION: 99 % | HEART RATE: 84 BPM | TEMPERATURE: 97.5 F

## 2020-07-01 DIAGNOSIS — K63.5 POLYP OF HEPATIC FLEXURE OF COLON: Primary | ICD-10-CM

## 2020-07-01 LAB — HCG UR QL: NEGATIVE

## 2020-07-01 PROCEDURE — 25800030 ZZH RX IP 258 OP 636: Performed by: SURGERY

## 2020-07-01 PROCEDURE — 25000128 H RX IP 250 OP 636: Performed by: NURSE ANESTHETIST, CERTIFIED REGISTERED

## 2020-07-01 PROCEDURE — 81025 URINE PREGNANCY TEST: CPT | Performed by: SURGERY

## 2020-07-01 PROCEDURE — 45385 COLONOSCOPY W/LESION REMOVAL: CPT | Performed by: NURSE ANESTHETIST, CERTIFIED REGISTERED

## 2020-07-01 PROCEDURE — 25000125 ZZHC RX 250: Performed by: NURSE ANESTHETIST, CERTIFIED REGISTERED

## 2020-07-01 PROCEDURE — 25000125 ZZHC RX 250: Performed by: SURGERY

## 2020-07-01 PROCEDURE — 45385 COLONOSCOPY W/LESION REMOVAL: CPT | Performed by: SURGERY

## 2020-07-01 PROCEDURE — 88305 TISSUE EXAM BY PATHOLOGIST: CPT

## 2020-07-01 PROCEDURE — 45385 COLONOSCOPY W/LESION REMOVAL: CPT | Mod: PT | Performed by: SURGERY

## 2020-07-01 RX ORDER — ONDANSETRON 2 MG/ML
4 INJECTION INTRAMUSCULAR; INTRAVENOUS EVERY 6 HOURS PRN
Status: CANCELLED | OUTPATIENT
Start: 2020-07-01

## 2020-07-01 RX ORDER — PROPOFOL 10 MG/ML
INJECTION, EMULSION INTRAVENOUS PRN
Status: DISCONTINUED | OUTPATIENT
Start: 2020-07-01 | End: 2020-07-01

## 2020-07-01 RX ORDER — NALOXONE HYDROCHLORIDE 0.4 MG/ML
.1-.4 INJECTION, SOLUTION INTRAMUSCULAR; INTRAVENOUS; SUBCUTANEOUS
Status: CANCELLED | OUTPATIENT
Start: 2020-07-01 | End: 2020-07-02

## 2020-07-01 RX ORDER — LIDOCAINE 40 MG/G
CREAM TOPICAL
Status: DISCONTINUED | OUTPATIENT
Start: 2020-07-01 | End: 2020-07-01 | Stop reason: HOSPADM

## 2020-07-01 RX ORDER — ONDANSETRON 4 MG/1
4 TABLET, ORALLY DISINTEGRATING ORAL EVERY 6 HOURS PRN
Status: CANCELLED | OUTPATIENT
Start: 2020-07-01

## 2020-07-01 RX ORDER — FLUMAZENIL 0.1 MG/ML
0.2 INJECTION, SOLUTION INTRAVENOUS
Status: CANCELLED | OUTPATIENT
Start: 2020-07-01 | End: 2020-07-01

## 2020-07-01 RX ORDER — ONDANSETRON 2 MG/ML
4 INJECTION INTRAMUSCULAR; INTRAVENOUS
Status: DISCONTINUED | OUTPATIENT
Start: 2020-07-01 | End: 2020-07-01 | Stop reason: HOSPADM

## 2020-07-01 RX ORDER — SODIUM CHLORIDE, SODIUM LACTATE, POTASSIUM CHLORIDE, CALCIUM CHLORIDE 600; 310; 30; 20 MG/100ML; MG/100ML; MG/100ML; MG/100ML
INJECTION, SOLUTION INTRAVENOUS CONTINUOUS
Status: DISCONTINUED | OUTPATIENT
Start: 2020-07-01 | End: 2020-07-01 | Stop reason: HOSPADM

## 2020-07-01 RX ORDER — PROPOFOL 10 MG/ML
INJECTION, EMULSION INTRAVENOUS CONTINUOUS PRN
Status: DISCONTINUED | OUTPATIENT
Start: 2020-07-01 | End: 2020-07-01

## 2020-07-01 RX ORDER — LIDOCAINE HYDROCHLORIDE 20 MG/ML
INJECTION, SOLUTION INFILTRATION; PERINEURAL PRN
Status: DISCONTINUED | OUTPATIENT
Start: 2020-07-01 | End: 2020-07-01

## 2020-07-01 RX ADMIN — PROPOFOL 100 MG: 10 INJECTION, EMULSION INTRAVENOUS at 08:14

## 2020-07-01 RX ADMIN — PROPOFOL 60 MG: 10 INJECTION, EMULSION INTRAVENOUS at 08:16

## 2020-07-01 RX ADMIN — SODIUM CHLORIDE, POTASSIUM CHLORIDE, SODIUM LACTATE AND CALCIUM CHLORIDE: 600; 310; 30; 20 INJECTION, SOLUTION INTRAVENOUS at 07:47

## 2020-07-01 RX ADMIN — PROPOFOL 180 MCG/KG/MIN: 10 INJECTION, EMULSION INTRAVENOUS at 08:16

## 2020-07-01 RX ADMIN — LIDOCAINE HYDROCHLORIDE 60 MG: 20 INJECTION, SOLUTION INFILTRATION; PERINEURAL at 08:14

## 2020-07-01 ASSESSMENT — MIFFLIN-ST. JEOR: SCORE: 1365.76

## 2020-07-01 NOTE — ANESTHESIA POSTPROCEDURE EVALUATION
Patient: Enid Arredondo    Procedure(s):  COLONOSCOPY, FLEXIBLE, WITH LESION REMOVAL USING SNARE    Diagnosis:Family history of colon cancer [Z80.0]  Diagnosis Additional Information: No value filed.    Anesthesia Type:  MAC    Note:  Anesthesia Post Evaluation    Patient location during evaluation: Bedside  Patient participation: Able to fully participate in evaluation  Level of consciousness: awake and alert  Pain management: adequate  Airway patency: patent  Cardiovascular status: acceptable  Respiratory status: acceptable  Hydration status: acceptable  PONV: none     Anesthetic complications: None          Last vitals:  Vitals:    07/01/20 0930 07/01/20 0945 07/01/20 0950   BP: 134/83 90/53 113/57   Pulse: 63 85 84   Resp:      Temp:      SpO2: 100% 100% 99%         Electronically Signed By: MARTHA Huynh CRNA  July 1, 2020  11:45 AM

## 2020-07-01 NOTE — ANESTHESIA CARE TRANSFER NOTE
Patient: Enid Arredondo    Procedure(s):  COLONOSCOPY, FLEXIBLE, WITH LESION REMOVAL USING SNARE    Diagnosis: Family history of colon cancer [Z80.0]  Diagnosis Additional Information: No value filed.    Anesthesia Type:   MAC     Note:  Airway :Room Air  Patient transferred to:Phase II  Handoff Report: Identifed the Patient, Identified the Reponsible Provider, Reviewed the pertinent medical history, Discussed the surgical course, Reviewed Intra-OP anesthesia mangement and issues during anesthesia, Set expectations for post-procedure period and Allowed opportunity for questions and acknowledgement of understanding      Vitals: (Last set prior to Anesthesia Care Transfer)    CRNA VITALS  7/1/2020 0820 - 7/1/2020 0850      7/1/2020             Pulse:  69    Ht Rate:  69    SpO2:  99 %    Resp Rate (set):  10                Electronically Signed By: MARTHA Huynh CRNA  July 1, 2020  8:50 AM

## 2020-07-01 NOTE — ANESTHESIA PREPROCEDURE EVALUATION
Anesthesia Pre-Procedure Evaluation    Patient: Enid Arredondo   MRN: [id number] : 1975          Preoperative Diagnosis: Family history of colon cancer [Z80.0]    Procedure(s):  COLONOSCOPY    Past Medical History:   Diagnosis Date     Cluster headache syndrome, not intractable     No Comments Provided     Psoriasis     No Comments Provided     Vaginal delivery     G4, para 3013 history of SAB .     Past Surgical History:   Procedure Laterality Date     COLONOSCOPY      ,normal, due 2020     ENHANCE LASER REFRACTIVE NEW PT IN PARAMETERS      ,Lasik eye surgery       Anesthesia Evaluation     . Pt has had prior anesthetic.     No history of anesthetic complications          ROS/MED HX    ENT/Pulmonary:  - neg pulmonary ROS     Neurologic:  - neg neurologic ROS     Cardiovascular:  - neg cardiovascular ROS       METS/Exercise Tolerance:  >4 METS   Hematologic:  - neg hematologic  ROS       Musculoskeletal:  - neg musculoskeletal ROS       GI/Hepatic:  - neg GI/hepatic ROS       Renal/Genitourinary:  - ROS Renal section negative       Endo:  - neg endo ROS       Psychiatric:  - neg psychiatric ROS       Infectious Disease:  - neg infectious disease ROS       Malignancy:      - no malignancy   Other:    - neg other ROS                      Physical Exam  Normal systems: cardiovascular, pulmonary and dental    Airway   Mallampati: I  TM distance: >3 FB  Neck ROM: full    Dental     Cardiovascular   Rhythm and rate: regular and normal      Pulmonary    breath sounds clear to auscultation            Lab Results   Component Value Date    HGB 16.1 (H) 2014    HCT 48.2 2014     2014     (L) 2014    POTASSIUM 3.7 2014    CHLORIDE 103 2014    CO2 26 2014    BUN 12 2014    CR 0.89 2014    GLC 83 2014    GIOVANNY 9.5 2014    ALBUMIN 4.5 2014    PROTTOTAL 7.3 2014    ALKPHOS 56 2014    BILITOTAL 0.3 2014     "HCG Negative 07/01/2020       Preop Vitals  BP Readings from Last 3 Encounters:   07/01/20 114/75   01/14/20 124/72   08/22/18 118/80    Pulse Readings from Last 3 Encounters:   07/01/20 61   01/14/20 76   08/22/18 72      Resp Readings from Last 3 Encounters:   07/01/20 18   01/14/20 16    SpO2 Readings from Last 3 Encounters:   07/01/20 96%      Temp Readings from Last 1 Encounters:   07/01/20 98.2  F (36.8  C) (Tympanic)    Ht Readings from Last 1 Encounters:   07/01/20 1.626 m (5' 4\")      Wt Readings from Last 1 Encounters:   07/01/20 72.6 kg (160 lb)    Estimated body mass index is 27.46 kg/m  as calculated from the following:    Height as of this encounter: 1.626 m (5' 4\").    Weight as of this encounter: 72.6 kg (160 lb).       Anesthesia Plan      History & Physical Review      ASA Status:  1 .    NPO Status:  > 6 hours    Plan for MAC with Intravenous induction.            Postoperative Care      Consents  Anesthetic plan, risks, benefits and alternatives discussed with:  Patient..                 MARTHA GARDNER CRNA  "

## 2020-07-01 NOTE — H&P
"PRE-PROCEDURE NOTE    CHIEF COMPLAINT / REASON FORPROCEDURE:  Need for screening colonoscopy.    PERTINENT HISTORY   Patient with no complaints. Previous colonoscopy . No diarrhea, constipation, abdominal pain or rectal bleeding. Family history of colon cancer-maternal grandmother, colon polyps-sister  Past Medical History:   Diagnosis Date     Cluster headache syndrome, not intractable     No Comments Provided     Psoriasis     No Comments Provided     Vaginal delivery     G4, para 3013 history of SAB .     Past Surgical History:   Procedure Laterality Date     COLONOSCOPY      ,normal, due 2020     ENHANCE LASER REFRACTIVE NEW PT IN PARAMETERS      ,Lasik eye surgery     Other:  None  Bleeding tendencies:  No    Relevant Family History:  yes, as above    Relevant Social History:  none    A relevant review of systems was performed and was Negative.    ALLERGIES/SENSITIVITIES: No Known Allergies     CURRENTMEDICATIONS:    No current facility-administered medications on file prior to encounter.   augmented betamethasone dipropionate (DIPROLENE-AF) 0.05 % external ointment, APPLY TOPICALLY TO THE AFFECTED AREAS 2 TIMES DAILY  bisacodyl (DULCOLAX) 5 MG EC tablet, Take as directed by colonoscopy prep.  [] polyethylene glycol-electrolytes (NULYTELY) 420 g solution, Take 4,000 mLs by mouth once for 1 dose      Current Facility-Administered Medications   Medication     lactated ringers infusion     lidocaine (LMX4) cream     lidocaine 1 % 0.1-1 mL     ondansetron (ZOFRAN) injection 4 mg     sodium chloride (PF) 0.9% PF flush 3 mL     sodium chloride (PF) 0.9% PF flush 3 mL       PRE-SEDATION ASSESSMENT:    /75   Pulse 61   Temp 98.2  F (36.8  C) (Tympanic)   Resp 18   Ht 1.626 m (5' 4\")   Wt 72.6 kg (160 lb)   SpO2 96%   BMI 27.46 kg/m    Lung Exam:  Normal  Heart Exam:  Normal    Comment(s):      IMPRESSION:  Need for screening colonoscopy.        "

## 2020-07-01 NOTE — DISCHARGE INSTRUCTIONS
Procedure you had done: colonoscopy with removal of polyp  Your health care provider is:  Talisha Hill  Your surgeon is Dr. Laurie Martell.   Please call your health care provider or surgeon at [phone] if:    - you feel you are getting worse or having an increase in problems    - fever greater than 101 degrees  - increasing shortness of breath or chest pain  - any signs of infection (increasing redness, swelling, tenderness, warmth, change in appearance, or  increased drainage)  - blood in your urine or stool  - coughing or vomiting blood  - nausea (upset stomach) and vomiting and/or diarrhea that will not stop  - severe pain that is not relieved by medicine, rest or ice  You have had medications for sedation. Please be aware that this can cause drowsiness and impaired judgment for up to 24 hours after your procedure. Do not drive, operate power tools or drink alcohol for 24 hours.  If samples were taken-you will get a phone call and a letter with your results in the next 7-10 days. If you don't get results, please call and let us know!

## 2020-07-01 NOTE — OP NOTE
PROCEDURE NOTE    SURGEON: Laurie Manzanares MD.    PRE-OP DIAGNOSIS:  Screening Colonoscopy      POST-OP DIAGNOSIS: colon polyp     Location: Hepatic flexure Size: 0.3 cm  Removed:  Y    PROCEDURE:  Colonoscopy with polypectomy  Cold snare    ESTIMATEDBLOOD LOSS: none    COMPLICATIONS:  None    SPECIMEN:  Hepatic flexure polyp    ANESTHESIA:  See anesthesia note    INDICATION FOR THE PROCEDURE: The patient is a 45 year old female. The patient has no complaints. I explained to the patient the risks, benefits and alternatives to screening colonoscopy for evaluating the colon for colon polyps and colon cancer. We specifically discussed the risks of bleeding, infection, perforation, potential inability to reach the cecum and the risks of sedation. The patient's questions were answered and the patient wished to proceed. Informed consent paperwork was completed.    PROCEDURE: The patient was taken to the endoscopy suite. Appropriate monitors were attached. The patient was placed in the left lateral decubitus position.Timeout was performed confirming the patient's identity and procedure to be performed. After appropriate sedation was confirmed, digital rectal exam was performed. There was normal tone and no gross abnormality was noted. The lubricated colonoscope was introduced into the anus the colon was insufflated with air. The prep quality was adequate. Under direct visualization the scope was advanced to the cecum. The ileocecal valve was intubated and the terminal ileum inspected. No gross abnormality was noted. The scope was withdrawn back into the cecum. The mucosa of colon was inspected while withdrawing the scope. A small sessile polyp was noted in the transverse colon at the hepatic flexure and removed with cold snare. The scope was retroflexed in the rectum and the anorectal junction was inspected. No abnormalities were noted. The scope was returned to a neutral position and the colon was decompressed. The scope  was removed. The patient tolerated the procedure with no immediately apparent complication. The patient was taken to recovery in stable condition.  FOLLOW UP:  RECOMMEND high fiber diet, follow up: will call with pathology results.

## 2020-07-03 NOTE — RESULT ENCOUNTER NOTE
Verenice/Carleen/Claudette/Dhara-Please call patient and let them know the colon polyp that was removed was a tubular adenoma. This type of polyp has a low risk of being associated with a colon cancer. Follow up colonoscopy is recommended in 3 years to check for new polyps. High fiber diet is recommended for colon health. Patient should call with questions or concerns. Thanks!

## 2021-03-14 DIAGNOSIS — L40.9 PSORIASIS: ICD-10-CM

## 2021-03-14 NOTE — LETTER
March 16, 2021      Enid Arredondo  [address] [address]        Dear Enid,       A refill of Betamethasone has been requested by your pharmacy and we noticed that you are overdue for an annual exam.  Your last comprehensive visit with Talisha King MD   was on 1/14/2020.    This refill request has been sent to your provider for consideration at this time.    Your health is very important to us.  Please call the clinic at [phone] to schedule your appointment.    Thank you for choosing [facility] and [facility] for your health care needs.    Sincerely,    Refill RN  [facility]

## 2021-03-15 NOTE — TELEPHONE ENCOUNTER
"[facility] sent Rx request for the following:     augmented betamethasone dipropionate  Sig: APPLY TOPICALLY TO THE AFFECTED AREAS 2 TIMES DAILY  Last Prescription Date:   1/14/2020  Last Fill Qty/Refills:         50, R-11    Last Office Visit:              1/14/2020   Future Office visit:           none    Routing refill request to provider for review/approval because:    Patient needs to be seen because it has been more than 1 year since last office visit.          Topical Steroids and Nonsteroidals Protocol Passed - 3/14/2021  1:37 PM        Passed - Patient is age 6 or older        Passed - Authorizing prescriber's most recent note related to this medication read.     If refill request is for ophthalmic use, please forward request to provider for approval.          Passed - High potency steroid not ordered        Passed - Recent (12 mo) or future (30 days) visit within the authorizing provider's specialty     Patient has had an office visit with the authorizing provider or a provider within the authorizing providers department within the previous 12 mos or has a future within next 30 days. See \"Patient Info\" tab in inbasket, or \"Choose Columns\" in Meds & Orders section of the refill encounter.              Passed - Medication is active on med list             3/15/2021: left generic message, due for annual appointment.   3/16/21: letter mailed regarding annual appointment.   Route to PCP for reorder as appropriate.       Unable to complete prescription refill per RN Medication Refill Policy.................... Maggi Childers RN ....................  3/16/2021   2:53 PM        "

## 2021-03-16 RX ORDER — BETAMETHASONE DIPROPIONATE 0.5 MG/G
OINTMENT, AUGMENTED TOPICAL
Qty: 50 G | Refills: 11 | Status: SHIPPED | OUTPATIENT
Start: 2021-03-16 | End: 2022-05-26

## 2022-04-25 ENCOUNTER — OFFICE VISIT (OUTPATIENT)
Dept: FAMILY MEDICINE | Facility: OTHER | Age: 47
End: 2022-04-25
Attending: FAMILY MEDICINE
Payer: COMMERCIAL

## 2022-04-25 VITALS
TEMPERATURE: 99.1 F | DIASTOLIC BLOOD PRESSURE: 78 MMHG | SYSTOLIC BLOOD PRESSURE: 126 MMHG | HEIGHT: 65 IN | HEART RATE: 76 BPM | WEIGHT: 163.8 LBS | OXYGEN SATURATION: 97 % | BODY MASS INDEX: 27.29 KG/M2

## 2022-04-25 DIAGNOSIS — L40.9 PSORIASIS: ICD-10-CM

## 2022-04-25 DIAGNOSIS — D22.9 ATYPICAL MOLE: ICD-10-CM

## 2022-04-25 DIAGNOSIS — Z00.00 PHYSICAL EXAM, ANNUAL: Primary | ICD-10-CM

## 2022-04-25 PROCEDURE — 87624 HPV HI-RISK TYP POOLED RSLT: CPT | Mod: ZL | Performed by: FAMILY MEDICINE

## 2022-04-25 PROCEDURE — 99396 PREV VISIT EST AGE 40-64: CPT | Performed by: FAMILY MEDICINE

## 2022-04-25 PROCEDURE — G0123 SCREEN CERV/VAG THIN LAYER: HCPCS | Performed by: FAMILY MEDICINE

## 2022-04-25 ASSESSMENT — PAIN SCALES - GENERAL: PAINLEVEL: NO PAIN (0)

## 2022-04-25 NOTE — NURSING NOTE
"Chief Complaint   Patient presents with     Physical     Last pap 4-11-17         Medication reconciliation completed.    FOOD SECURITY SCREENING QUESTIONS:    The next two questions are to help us understand your food security.  If you are feeling you need any assistance in this area, we have resources available to support you today.    Hunger Vital Signs:  Within the past 12 months we worried whether our food would run out before we got money to buy more. Never  Within the past 12 months the food we bought just didn't last and we didn't have money to get more. Never    Initial /78 (BP Location: Right arm, Patient Position: Sitting, Cuff Size: Adult Regular)   Pulse 76   Temp 99.1  F (37.3  C) (Tympanic)   Ht 1.651 m (5' 5\")   Wt 74.3 kg (163 lb 12.8 oz)   SpO2 97%   BMI 27.26 kg/m   Estimated body mass index is 27.26 kg/m  as calculated from the following:    Height as of this encounter: 1.651 m (5' 5\").    Weight as of this encounter: 74.3 kg (163 lb 12.8 oz).       Guera Ashton LPN .......  4/25/2022  3:31 PM  "

## 2022-04-25 NOTE — LETTER
April 28, 2022      Enid Poolejessicabreemoriahnael  [address]  [facility] [address]        Dear ,    We are writing to inform you of your test results.    Your pap smear and HPV results are normal.  Your next pap smear is due in 5 years.      Resulted Orders   Pap Screen Thin Prep   Result Value Ref Range    Interpretation        Negative for Intraepithelial Lesion or Malignancy (NILM)    Comment         Papanicolaou Test Limitations:  Cervical cytology is a screening test with limited sensitivity, and regular screening is critical for cancer prevention.  Pap tests are primarily effective for the diagnosis/prevention of squamous cell carcinoma, not adenocarcinoma or other cancers.        Specimen Adequacy       Satisfactory for evaluation, endocervical/transformation zone component absent    Clinical Information       none      Reflex Testing Yes regardless of result     Previous Abnormal?       No      Performing Labs       The technical component of this testing was completed at [facility]     HPV High Risk Types DNA Cervical   Result Value Ref Range    Other HR HPV Negative Negative    HPV16 DNA Negative Negative    HPV18 DNA Negative Negative    FINAL DIAGNOSIS       This patient's sample is negative for HPV DNA.          METHODOLOGY: The Roche Asad 4800 system uses automated extraction, simultaneous amplification of HPV (L1 region) and beta-globin, followed by real time detection of fluorescent labeled HPV and beta globin using specific oligonucleotide probes. The test specifically identified types HPV 16 DNA and HPV 18 DNA while concurrently detecting the rest of the high risk types (31, 33, 35, 39, 45, 51, 52, 56, 58, 59, 66 or 68).    COMMENTS: This test is not intended for use as a screening device for woman under age 30 with normal cervical cytology. Results should be correlated with cytologic and histologic findings. Close clinical followup is recommended.           If you  have any questions or concerns, please call the clinic at the number listed above.       Sincerely,      Talisha Crowell MD

## 2022-04-25 NOTE — PROGRESS NOTES
"Nursing Notes:   Guera Ashton LPN  4/25/2022  3:36 PM  Signed  Chief Complaint   Patient presents with     Physical     Last pap 4-11-17         Medication reconciliation completed.    FOOD SECURITY SCREENING QUESTIONS:    The next two questions are to help us understand your food security.  If you are feeling you need any assistance in this area, we have resources available to support you today.    Hunger Vital Signs:  Within the past 12 months we worried whether our food would run out before we got money to buy more. Never  Within the past 12 months the food we bought just didn't last and we didn't have money to get more. Never    Initial /78 (BP Location: Right arm, Patient Position: Sitting, Cuff Size: Adult Regular)   Pulse 76   Temp 99.1  F (37.3  C) (Tympanic)   Ht 1.651 m (5' 5\")   Wt 74.3 kg (163 lb 12.8 oz)   SpO2 97%   BMI 27.26 kg/m   Estimated body mass index is 27.26 kg/m  as calculated from the following:    Height as of this encounter: 1.651 m (5' 5\").    Weight as of this encounter: 74.3 kg (163 lb 12.8 oz).       Guera Ashton LPN .......  4/25/2022  3:31 PM      SUBJECTIVE:   CC: Enid Arredondo is an 47 year old woman who presents for preventive health visit.       HPI Enid Arredondo is a 47 year old female is in for annual exam.    Pap smear is due  Mammogram is due.  Immunizations are up-to-date.  Current method of birth control is vasectomy    Psoriasis -manages this with topicals to her degree of satisfaction  Moles:  Right side of back and face/below her nose   -previously has had benign moles removed.            Today's PHQ-2 Score:   PHQ-2 ( 1999 [employer]) 4/25/2022   Q1: Little interest or pleasure in doing things 0   Q2: Feeling down, depressed or hopeless 0   PHQ-2 Score 0   PHQ-2 Total Score (12-17 Years)- Positive if 3 or more points; Administer PHQ-A if positive -   Q1: Little interest or pleasure in doing things Not at all   Q2: Feeling down, depressed " or hopeless Not at all   PHQ-2 Score 0       Abuse: Current or Past (Physical, Sexual or Emotional) - No  Do you feel safe in your environment? Yes    Have you ever done Advance Care Planning? (For example, a Health Directive, POLST, or a discussion with a medical provider or your loved ones about your wishes): not on file    Social History     Tobacco Use     Smoking status: Never Smoker     Smokeless tobacco: Never Used   Substance Use Topics     Alcohol use: Yes         Alcohol Use 4/25/2022   Prescreen: >3 drinks/day or >7 drinks/week? No       Reviewed orders with patient.  Reviewed health maintenance and updated orders accordingly - Yes      Breast Cancer Screening:  Any new diagnosis of family breast, ovarian, or bowel cancer? No    FHS-7: No flowsheet data found.    Mammogram Screening: Recommended annual mammography  Pertinent mammograms are reviewed under the imaging tab.    History of abnormal Pap smear: NO - age 30-65 PAP every 5 years with negative HPV co-testing recommended     Reviewed and updated as needed this visit by clinical staff   Tobacco  Allergies  Meds      Soc Hx          Reviewed and updated as needed this visit by Provider                   Past Medical History:   Diagnosis Date     Cluster headache syndrome, not intractable     No Comments Provided     Psoriasis     No Comments Provided     Vaginal delivery     G4, para 3013 history of SAB 2006.      Past Surgical History:   Procedure Laterality Date     COLONOSCOPY      2010,normal, due 2020     ENHANCE LASER REFRACTIVE NEW PT IN PARAMETERS      2007,Lasik eye surgery       Review of Systems  Last dentist was a year ago  History of lasik   No heartburn/indigestion  Musculoskeletal:  Occasional left knee pain  Derm - see above  Sleep  - good   - bladder is ok; periods are pretty regular      OBJECTIVE:   /78 (BP Location: Right arm, Patient Position: Sitting, Cuff Size: Adult Regular)   Pulse 76   Temp 99.1  F (37.3  C)  "(Tympanic)   Ht 1.651 m (5' 5\")   Wt 74.3 kg (163 lb 12.8 oz)   SpO2 97%   BMI 27.26 kg/m    Physical Exam  GENERAL: healthy, alert and no distress  EYES: Eyes grossly normal to inspection, PERRL and conjunctivae and sclerae normal  HENT: ear canals and TM's normal  NECK: no adenopathy, no asymmetry, masses, or scars and thyroid normal to palpation  RESP: lungs clear to auscultation - no rales, rhonchi or wheezes  CV: regular rate and rhythm  ABDOMEN: soft, nontender, no hepatosplenomegaly, no masses and bowel sounds normal  MS: no gross musculoskeletal defects noted, no edema   - pap smear obtained   SKIN: raised 8mm nodule below left nodule; right mid back with raised tag    Psoriatic patches on her elbows  NEURO: Normal strength and tone, mentation intact and speech normal  PSYCH: mentation appears normal, affect normal/bright        ASSESSMENT/PLAN:       ICD-10-CM    1. Physical exam, annual  Z00.00 MA Screen Bilateral w/Shawn     HPV High Risk Types DNA Cervical     Pap Screen Thin Prep     HPV High Risk Types DNA Cervical   2. Psoriasis  L40.9    3. Atypical mole  D22.9 Adult General Surg Referral             COUNSELING:  Reviewed preventive health counseling, as reflected in patient instructions       Regular exercise       Healthy diet/nutrition       Pap smear obtained today, mammogram scheduled    Estimated body mass index is 27.26 kg/m  as calculated from the following:    Height as of this encounter: 1.651 m (5' 5\").    Weight as of this encounter: 74.3 kg (163 lb 12.8 oz).        She reports that she has never smoked. She has never used smokeless tobacco.        Counseling Resources:  ATP IV Guidelines  Pooled Cohorts Equation Calculator  Breast Cancer Risk Calculator  BRCA-Related Cancer Risk Assessment: FHS-7 Tool  FRAX Risk Assessment  ICSI Preventive Guidelines  Dietary Guidelines for Americans, 2010  USDA's MyPlate  ASA Prophylaxis  Lung CA Screening    AZAM ARIAS MD  [facility]

## 2022-04-27 LAB
BKR LAB AP GYN ADEQUACY: NORMAL
BKR LAB AP GYN INTERPRETATION: NORMAL
BKR LAB AP HPV REFLEX: NORMAL
BKR LAB AP PREVIOUS ABNORMAL: NORMAL
PATH REPORT.COMMENTS IMP SPEC: NORMAL
PATH REPORT.COMMENTS IMP SPEC: NORMAL
PATH REPORT.RELEVANT HX SPEC: NORMAL

## 2022-04-28 LAB
HUMAN PAPILLOMA VIRUS 16 DNA: NEGATIVE
HUMAN PAPILLOMA VIRUS 18 DNA: NEGATIVE
HUMAN PAPILLOMA VIRUS FINAL DIAGNOSIS: NORMAL
HUMAN PAPILLOMA VIRUS OTHER HR: NEGATIVE

## 2022-05-02 ENCOUNTER — HOSPITAL ENCOUNTER (OUTPATIENT)
Dept: MAMMOGRAPHY | Facility: OTHER | Age: 47
Discharge: HOME | End: 2022-05-02
Attending: FAMILY MEDICINE | Admitting: FAMILY MEDICINE
Payer: COMMERCIAL

## 2022-05-02 DIAGNOSIS — Z12.31 VISIT FOR SCREENING MAMMOGRAM: ICD-10-CM

## 2022-05-02 DIAGNOSIS — Z00.00 PHYSICAL EXAM, ANNUAL: ICD-10-CM

## 2022-05-02 PROCEDURE — 77067 SCR MAMMO BI INCL CAD: CPT

## 2022-05-25 DIAGNOSIS — L40.9 PSORIASIS: ICD-10-CM

## 2022-05-26 RX ORDER — BETAMETHASONE DIPROPIONATE 0.5 MG/G
OINTMENT, AUGMENTED TOPICAL
Qty: 50 G | Refills: 11 | Status: SHIPPED | OUTPATIENT
Start: 2022-05-26 | End: 2023-08-14

## 2022-05-26 NOTE — TELEPHONE ENCOUNTER
"Requested Prescriptions   Pending Prescriptions Disp Refills     augmented betamethasone dipropionate (DIPROLENE-AF) 0.05 % external ointment [Pharmacy Med Name: BETAMETHASONE DP AUG 0.05% OIN] 50 g 11     Sig: APPLY TOPICALLY TO THE AFFECTED AREAS 2 TIMES DAILY       Topical Steroids and Nonsteroidals Protocol Passed - 5/25/2022  3:30 PM        Passed - Patient is age 6 or older        Passed - Authorizing prescriber's most recent note related to this medication read.     If refill request is for ophthalmic use, please forward request to provider for approval.          Passed - High potency steroid not ordered        Passed - Recent (12 mo) or future (30 days) visit within the authorizing provider's specialty     Patient has had an office visit with the authorizing provider or a provider within the authorizing providers department within the previous 12 mos or has a future within next 30 days. See \"Patient Info\" tab in inbasket, or \"Choose Columns\" in Meds & Orders section of the refill encounter.              Passed - Medication is active on med list     Last Written Prescription Date:  3/16/21  Last Fill Quantity: 50 g,   # refills: 11  Last Office Visit: 4/25/22 Zay Crowell  Future Office visit: none      Routing refill request to provider for review/approval:  Brenda J. Goodell, RN on 5/26/2022 at 1:51 PM      "

## 2022-08-23 ENCOUNTER — OFFICE VISIT (OUTPATIENT)
Dept: SURGERY | Facility: OTHER | Age: 47
End: 2022-08-23
Attending: FAMILY MEDICINE
Payer: COMMERCIAL

## 2022-08-23 VITALS
SYSTOLIC BLOOD PRESSURE: 120 MMHG | HEART RATE: 86 BPM | OXYGEN SATURATION: 98 % | DIASTOLIC BLOOD PRESSURE: 70 MMHG | RESPIRATION RATE: 16 BRPM | TEMPERATURE: 97.7 F | WEIGHT: 162 LBS | BODY MASS INDEX: 26.96 KG/M2

## 2022-08-23 DIAGNOSIS — D22.9 ATYPICAL MOLE: ICD-10-CM

## 2022-08-23 DIAGNOSIS — L98.9 SKIN LESION OF LEFT ARM: Primary | ICD-10-CM

## 2022-08-23 PROCEDURE — 88342 IMHCHEM/IMCYTCHM 1ST ANTB: CPT

## 2022-08-23 PROCEDURE — 88341 IMHCHEM/IMCYTCHM EA ADD ANTB: CPT

## 2022-08-23 PROCEDURE — 11402 EXC TR-EXT B9+MARG 1.1-2 CM: CPT | Mod: XS | Performed by: SURGERY

## 2022-08-23 PROCEDURE — 17110 DESTRUCTION B9 LES UP TO 14: CPT | Performed by: SURGERY

## 2022-08-23 PROCEDURE — 88305 TISSUE EXAM BY PATHOLOGIST: CPT

## 2022-08-23 ASSESSMENT — PAIN SCALES - GENERAL: PAINLEVEL: NO PAIN (0)

## 2022-08-23 NOTE — PATIENT INSTRUCTIONS
Your incision was closed with stitches that will dissolve.     It is ok to remove the dressing and get the incision wet in the shower on the day after your procedure.     Don't soak in a tub, pool or lake for 5 days. The small butterfly strips will start to peel off in 5-7 days it is ok to remove them. If you have concerns, please call.      What to Expect Following Cryosurgery (Liquid Nitrogen)   What isCryosurgery?   Cryosurgery is a technique for removing skin lesions that primarily involve the surface of the skin, such as warts, seborrheic keratosis, or actinic keratosis. It is a quick method of removing the lesion with minimal scarring.   The liquid nitrogen needs doreen applied long enough to freeze the affected skin. By freezing the skin, a blister is created underneath the lesion. Ideally, as the new skin forms underneath the blister, the abnormal skin on the roof of the blister peelsoff. Occasionally, if the lesion is very thick (such as a large wart), only the surface is blistered off. The base or residual lesion may need to be frozen at another visit.   It takes about one to two weeks for the scabto fall off, which is when the new layer of skin has formed under the blister. Areas of thinner skin, such as the face, may heal a little faster.      What to Expect Over the Next Few Weeks   ? During Treatment - Area being treated will sting, burn, and then possibly itch.   ? Immediately After Treatment - Area will be red, sore, and swollen.   ? Next Day - Blister or blood blister has formed, tendernessstarts to subside. Apply a Band-Aid if necessary.   ? 7 Days - Surface is dark red/brown and scab-like. You can apply an antibacterial ointment, such as Polysporin , but you don't have to.   ? 2 to 4 Weeks - The surface starts to peel off. This may be encouraged gently during bathing, when the scab is softened.   ? No makeup should be applied until area is fully healed.      How to Take Care of the Skin after  Cryosurgery   A Band-Aid can be used for larger blisters or blisters in areas that are more likely to betraumatized -such as fingers and toes. If the area becomes dry or crusted, an ointment (Vaseline , Bacitracin , or Polysporin ) can also be applied.   Cleanse area with a mild cleanser and cool water.   Patthe area dry with a lint-free cloth.   Avoid glycolic acids, Vitamin C, scrubs, Tretinoin (Retin-A), and Retinol creams for 7 to 10 days.  The area may get wet while bathing, but swimming or hot tub use should beavoided for one week following a treatment or while the skin is open.   Within 24 hours, you can expect the area to be swollen and/or blistered. The blister may not be visible to the naked eye.   Within one week, theswelling goes down. The top becomes dark red and scab-like. The scab will loosen over the next weeks, and should fall off within one month.      Adverse Effects   The most common adverse effects are pain,swelling/blistering, potential for infection, and discoloration of the skin after it heals.     Blisters  Anytime a blister surfaces, whether from ill-fitting shoes, an oven burn, or liquid nitrogencryosurgery, it will be a bit painful. For most patients, the pain is a temporary sting with some discomfort periodically over the next day as the blister forms.   The goal is to achieve a blister. This means, mostcommonly, patients will have a blister form following treatment. Sometimes, the blister is so thin that it can't be seen and may have minimal swelling. Occasionally, a blood blister forms that can be quite dramatic but isharmless.   Rarely, the blister may become infected. When this happens, the blister becomes unusually tender, the fluid becomes cloudy, and the redness around it becomes more extensive (and may even form streaks). If this happens, contact our office.   Some lesions, especially those on the face, may leave a slight palediscoloration.   True scarring, involving deeper  layers of the skin is unlikely.

## 2022-08-23 NOTE — NURSING NOTE
"Chief Complaint   Patient presents with     Procedure     Right back and face and left arm       Initial /70 (BP Location: Right arm, Patient Position: Sitting, Cuff Size: Adult Regular)   Pulse 86   Temp 97.7  F (36.5  C) (Tympanic)   Resp 16   Wt 73.5 kg (162 lb)   SpO2 98%   BMI 26.96 kg/m   Estimated body mass index is 26.96 kg/m  as calculated from the following:    Height as of 4/25/22: 1.651 m (5' 5\").    Weight as of this encounter: 73.5 kg (162 lb).  Medication Reconciliation: complete    Verenice Salinas LPN    "

## 2022-08-23 NOTE — NURSING NOTE
Prior to the start of the procedure and with procedural staff participation, I verbally confirmed the patient s identity using two indicators, relevant allergies, that the procedure was appropriate and matched the consent or emergent situation, and that the correct equipment/implants were available. Immediately prior to starting the procedure I conducted the Time Out with the procedural staff and re-confirmed the patient s name, procedure, and site/side. (The Joint Commission universal protocol was followed.)  Yes    Sedation (Moderate or Deep): None  Verenice Salinas LPN .......8/23/2022 8:58 AM

## 2022-08-23 NOTE — PROGRESS NOTES
SUBJECTIVE:   47 year old female presents for lesion removal from her upper arm. This lesion has been present for awhile but is slowly growing and won't go away. There is also lesion a soft tag on her back that gets caught in her clothes and bleeds sometimes. She has two moles on her upper lip that are growing. She does want those taken off but not right now because of school starting up soon.  OBJECTIVE:   /70 (BP Location: Right arm, Patient Position: Sitting, Cuff Size: Adult Regular)   Pulse 86   Temp 97.7  F (36.5  C) (Tympanic)   Resp 16   Wt 73.5 kg (162 lb)   SpO2 98%   BMI 26.96 kg/m    General: NAD  Skin: upper lip left side, 0.8 and 0.3 cm soft, pink/brown nodules, left upper arm 0.7 cm pink, firm nodule with telangectasia, mid back on the right 0.3 cm soft skin tag with mild inflammation, right mid to lower back with 0.2 cm dark brown lesion with slightly irregular borders (photo in media tab)     ASSESSMENT:   Lesion size: see above   Defect size: lesion and margin-left upper arm 1.3 x 0.8 x 0.3 cm closure simple     PROCEDURE:   The pathophysiology of skin lesions and skin cancer was discussed with the patient. The risks, benefits and alternatives to excision of the lesion from her left upper arm were discussed with the patient, including the risks of infection, scarring, bruising, bleeding and the possible need for further procedures.   We discussed cryotherapy of the lesion on her back. We specifically discussed the risks of infection, discoloration and the possible need for further treatments. The patient expressed understanding and the patient wishes to proceed.  Procedure:  The area of the skin lesion on her back was treated with liquid nitrogen for 2 freeze thaw cycles. The patient tolerated the procedure with no immediately apparent complications.   Chloraprep was used to cleanse the skin in the area of the lesion on her left upper arm. 1% Lidocaine with epinephrine was infiltrated  in the skin and subcutaneous tissue in the area of the lesion. When appropriate anesthesia had beenachieved, the lesion was sharply excised with a margin of grossly normal tissue. The skin edges were approximated using 4-0 monocryl. Sterile dressing was applied. The specimen was labelled and sent topathology for evaluation. The procedure was well tolerated without complications. Patient was given post procedure instructions and denied further questions. We will call the patient with pathology results.

## 2022-08-31 LAB
PATH REPORT.COMMENTS IMP SPEC: NORMAL
PATH REPORT.FINAL DX SPEC: NORMAL
PHOTO IMAGE: NORMAL

## 2022-10-16 NOTE — PROGRESS NOTES
Patient Information     Patient Name  Enid Arredondo MRN  [id number] Sex  Female   1975      Letter by Talisha Sood MD at      Author:  Talisha Sood MD Service:  (none) Author Type:  (none)    Filed:   Encounter Date:  3/3/2017 Status:  (Other)           Enid Arredondo  [address] [address]          March 3, 2017    Dear Ms. Arredondo:    This is to remind you that you are due for your annual office visit with Talisha King MD. Your last visit was on 2013.     Additional refills of your medication require you to complete this visit.    Please call [phone] to schedule your appointment.    Thank you for choosing [facility] for your health care needs.    Sincerely,      Refill RN  [facility]          
[facility]

## 2023-04-18 ENCOUNTER — OFFICE VISIT (OUTPATIENT)
Dept: SURGERY | Facility: OTHER | Age: 48
End: 2023-04-18
Attending: SURGERY
Payer: COMMERCIAL

## 2023-04-18 VITALS
HEIGHT: 65 IN | SYSTOLIC BLOOD PRESSURE: 120 MMHG | DIASTOLIC BLOOD PRESSURE: 86 MMHG | WEIGHT: 153 LBS | OXYGEN SATURATION: 98 % | HEART RATE: 78 BPM | TEMPERATURE: 98.1 F | RESPIRATION RATE: 16 BRPM | BODY MASS INDEX: 25.49 KG/M2

## 2023-04-18 DIAGNOSIS — L98.9 FACIAL SKIN LESION: Primary | ICD-10-CM

## 2023-04-18 DIAGNOSIS — L91.8 INFLAMED SKIN TAG: ICD-10-CM

## 2023-04-18 PROCEDURE — 88305 TISSUE EXAM BY PATHOLOGIST: CPT

## 2023-04-18 PROCEDURE — 11441 EXC FACE-MM B9+MARG 0.6-1 CM: CPT | Mod: XS | Performed by: SURGERY

## 2023-04-18 PROCEDURE — 17110 DESTRUCTION B9 LES UP TO 14: CPT | Performed by: SURGERY

## 2023-04-18 PROCEDURE — 11440 EXC FACE-MM B9+MARG 0.5 CM/<: CPT | Mod: XS | Performed by: SURGERY

## 2023-04-18 RX ORDER — ASCORBIC ACID 1000 MG
1 TABLET ORAL DAILY
COMMUNITY

## 2023-04-18 ASSESSMENT — PAIN SCALES - GENERAL: PAINLEVEL: NO PAIN (0)

## 2023-04-18 NOTE — NURSING NOTE
"Chief Complaint   Patient presents with     Procedure     Skin lesions on back and face       Initial /86 (BP Location: Right arm, Patient Position: Sitting, Cuff Size: Adult Regular)   Pulse 78   Temp 98.1  F (36.7  C) (Tympanic)   Resp 16   Ht 1.645 m (5' 4.75\")   Wt 69.4 kg (153 lb)   SpO2 98%   BMI 25.66 kg/m   Estimated body mass index is 25.66 kg/m  as calculated from the following:    Height as of this encounter: 1.645 m (5' 4.75\").    Weight as of this encounter: 69.4 kg (153 lb).  Medication Reconciliation: complete    Verenice Salinas LPN  Prior to the start of the procedure and with procedural staff participation, I verbally confirmed the patient s identity using two indicators, relevant allergies, that the procedure was appropriate and matched the consent or emergent situation, and that the correct equipment/implants were available. Immediately prior to starting the procedure I conducted the Time Out with the procedural staff and re-confirmed the patient s name, procedure, and site/side. (The Joint Commission universal protocol was followed.)  Yes    Sedation (Moderate or Deep): None  Verenice Salinas LPN .......4/18/2023 9:22 AM    "

## 2023-04-18 NOTE — PROGRESS NOTES
"SUBJECTIVE:   48 year old female presents for lesion removal from her face. These lesions have been present for years. She has had the larger lesion treated with cryotherapy before. It has grown some and now there is a smaller nodule next to it. There is also lesion on her back that got smaller after cryotherapy but didn't go away. Wants to keep an eye on a dark mole on her right thigh. See media tab.  Has a history of significant sun exposure. .   OBJECTIVE:   /86 (BP Location: Right arm, Patient Position: Sitting, Cuff Size: Adult Regular)   Pulse 78   Temp 98.1  F (36.7  C) (Tympanic)   Resp 16   Ht 1.645 m (5' 4.75\")   Wt 69.4 kg (153 lb)   LMP 03/29/2023 (Approximate)   SpO2 98%   BMI 25.66 kg/m    General: NAD   Skin: upper lip, left side 0.6 cm round, soft pink nodule, just lateral to that is a 0.2 cm soft, pink nodule, mid back right side, 0.3 cm soft papule with minimal inflammation due to rubbing on her bra. Right inner thigh with 0.4 cm dark brown, flat lesion     ASSESSMENT:   Lesion size: see above   Defect size:  Upper lip-left of midline 0.8 x0.6 x 0.3 cm and 0.3 x 0.2 x 0.2 cm -both with simple closure    PROCEDURE:   The pathophysiology of skin lesions and skin cancer was discussed with the patient. The risks, benefits and alternatives to excision of the lesions from her face were discussed with the patient, including the risks of infection, scarring, bruising, bleeding and the possible need for further procedures.   We discussed cryotherapy of the lesion on her back. We specifically discussed the risks of infection, discoloration and the possible need for further treatments. The patient expressed understanding and the patient wishes to proceed.   Procedure:   The area of the skin lesion on the mid back-right side was treated with liquid nitrogen for 2 freeze thaw cycles. The patient tolerated the procedure with no immediately apparent complications.   Betadine was used to cleanse the " skin in the area of the lesions on the upper lip. 1% Lidocaine with epinephrine was infiltrated in the skin and subcutaneous tissue in the area of the lesion. When appropriate anesthesia had been achieved, the lesions were sharply excised with a margin of grossly normal tissue. The skin edges were approximated using 5-0 gut. Sterile dressing was applied. The specimens were labelled and sent topathology for evaluation. The procedure was well tolerated without complications. Patient was given post procedure instructions and denied further questions. We will call the patient with pathology results.

## 2023-04-18 NOTE — PATIENT INSTRUCTIONS
Your incision was closed with stitches that will dissolve.     It is ok to get the incision wet in the shower on the day after your procedure.     Don't soak in a tub, pool or lake for 5 days. The small butterfly strips will start to peel off in 5-7 days it is ok to remove them.     If you have concerns, please call.    What to Expect Following Cryosurgery (Liquid Nitrogen)   What isCryosurgery?   Cryosurgery is a technique for removing skin lesions that primarily involve the surface of the skin, such as warts, seborrheic keratosis, or actinic keratosis. It is a quick method of removing the lesion with minimal scarring.   The liquid nitrogen needs doreen applied long enough to freeze the affected skin. By freezing the skin, a blister is created underneath the lesion. Ideally, as the new skin forms underneath the blister, the abnormal skin on the roof of the blister peelsoff. Occasionally, if the lesion is very thick (such as a large wart), only the surface is blistered off. The base or residual lesion may need to be frozen at another visit.   It takes about one to two weeks for the scabto fall off, which is when the new layer of skin has formed under the blister. Areas of thinner skin, such as the face, may heal a little faster.      What to Expect Over the Next Few Weeks   ? During Treatment - Area being treated will sting, burn, and then possibly itch.   ? Immediately After Treatment - Area will be red, sore, and swollen.   ? Next Day - Blister or blood blister has formed, tendernessstarts to subside. Apply a Band-Aid if necessary.   ? 7 Days - Surface is dark red/brown and scab-like. You can apply an antibacterial ointment, such as Polysporin , but you don't have to.   ? 2 to 4 Weeks - The surface starts to peel off. This may be encouraged gently during bathing, when the scab is softened.   ? No makeup should be applied until area is fully healed.      How to Take Care of the Skin after Cryosurgery   A Band-Aid can  be used for larger blisters or blisters in areas that are more likely to betraumatized -such as fingers and toes. If the area becomes dry or crusted, an ointment (Vaseline , Bacitracin , or Polysporin ) can also be applied.   Cleanse area with a mild cleanser and cool water.   Patthe area dry with a lint-free cloth.   Avoid glycolic acids, Vitamin C, scrubs, Tretinoin (Retin-A), and Retinol creams for 7 to 10 days.  The area may get wet while bathing, but swimming or hot tub use should beavoided for one week following a treatment or while the skin is open.   Within 24 hours, you can expect the area to be swollen and/or blistered. The blister may not be visible to the naked eye.   Within one week, theswelling goes down. The top becomes dark red and scab-like. The scab will loosen over the next weeks, and should fall off within one month.      Adverse Effects   The most common adverse effects are pain,swelling/blistering, potential for infection, and discoloration of the skin after it heals.     Blisters  Anytime a blister surfaces, whether from ill-fitting shoes, an oven burn, or liquid nitrogencryosurgery, it will be a bit painful. For most patients, the pain is a temporary sting with some discomfort periodically over the next day as the blister forms.   The goal is to achieve a blister. This means, mostcommonly, patients will have a blister form following treatment. Sometimes, the blister is so thin that it can't be seen and may have minimal swelling. Occasionally, a blood blister forms that can be quite dramatic but isharmless.   Rarely, the blister may become infected. When this happens, the blister becomes unusually tender, the fluid becomes cloudy, and the redness around it becomes more extensive (and may even form streaks). If this happens, contact our office.   Some lesions, especially those on the face, may leave a slight palediscoloration.   True scarring, involving deeper layers of the skin is unlikely.

## 2023-04-21 LAB
PATH REPORT.COMMENTS IMP SPEC: NORMAL
PATH REPORT.FINAL DX SPEC: NORMAL
PHOTO IMAGE: NORMAL

## 2023-08-14 DIAGNOSIS — L40.9 PSORIASIS: ICD-10-CM

## 2023-08-14 RX ORDER — BETAMETHASONE DIPROPIONATE 0.5 MG/G
OINTMENT, AUGMENTED TOPICAL
Qty: 50 G | Refills: 11 | Status: SHIPPED | OUTPATIENT
Start: 2023-08-14 | End: 2024-09-29

## 2023-08-25 ENCOUNTER — HOSPITAL ENCOUNTER (OUTPATIENT)
Dept: MAMMOGRAPHY | Facility: OTHER | Age: 48
Discharge: HOME | End: 2023-08-25
Attending: FAMILY MEDICINE | Admitting: FAMILY MEDICINE
Payer: COMMERCIAL

## 2023-08-25 DIAGNOSIS — Z12.31 VISIT FOR SCREENING MAMMOGRAM: ICD-10-CM

## 2023-08-25 PROCEDURE — 77067 SCR MAMMO BI INCL CAD: CPT

## 2023-08-31 ENCOUNTER — HOSPITAL ENCOUNTER (OUTPATIENT)
Dept: MAMMOGRAPHY | Facility: OTHER | Age: 48
Discharge: HOME | End: 2023-08-31
Attending: FAMILY MEDICINE
Payer: COMMERCIAL

## 2023-08-31 ENCOUNTER — HOSPITAL ENCOUNTER (OUTPATIENT)
Dept: ULTRASOUND IMAGING | Facility: OTHER | Age: 48
Discharge: HOME | End: 2023-08-31
Attending: FAMILY MEDICINE
Payer: COMMERCIAL

## 2023-08-31 DIAGNOSIS — R92.8 ABNORMAL FINDING ON BREAST IMAGING: ICD-10-CM

## 2023-08-31 PROCEDURE — 76642 ULTRASOUND BREAST LIMITED: CPT | Mod: RT

## 2023-08-31 PROCEDURE — 77061 BREAST TOMOSYNTHESIS UNI: CPT | Mod: RT

## 2023-10-17 ENCOUNTER — OFFICE VISIT (OUTPATIENT)
Dept: SURGERY | Facility: OTHER | Age: 48
End: 2023-10-17
Attending: SURGERY
Payer: COMMERCIAL

## 2023-10-17 VITALS
SYSTOLIC BLOOD PRESSURE: 138 MMHG | HEART RATE: 80 BPM | RESPIRATION RATE: 16 BRPM | OXYGEN SATURATION: 98 % | WEIGHT: 149 LBS | BODY MASS INDEX: 24.99 KG/M2 | DIASTOLIC BLOOD PRESSURE: 82 MMHG | TEMPERATURE: 98.8 F

## 2023-10-17 DIAGNOSIS — Z86.0100 HISTORY OF COLON POLYPS: ICD-10-CM

## 2023-10-17 DIAGNOSIS — L98.9 SKIN LESION OF RIGHT LEG: Primary | ICD-10-CM

## 2023-10-17 PROCEDURE — 99212 OFFICE O/P EST SF 10 MIN: CPT | Performed by: SURGERY

## 2023-10-17 ASSESSMENT — PAIN SCALES - GENERAL: PAINLEVEL: NO PAIN (0)

## 2023-10-17 NOTE — PROGRESS NOTES
Primary Care Physician: AZAM ARIAS MD      HPI:   Patient is here for skin check. The patient is 48 year old female with several skin spots that have been removed.   Has a history of extensive sun exposure. Has been using sunscreen and avoiding sun more recently. Concerning lesions today: right inner thigh lesion. Has healed at previous excision sites.    ASSESSMENT AND PLAN/RECOMMENDATIONS:   Stable pigmented skin lesion right thigh. Continue with self checks-call for changes or concerns.   History of colon polyps-due for screening follow up.   Continue with yearly mammogram for breast cancer screening.     Procedure:   none    Past Medical History:   Diagnosis Date    Cluster headache syndrome, not intractable     No Comments Provided    Psoriasis     No Comments Provided    Vaginal delivery     G4, para 3013 history of SAB .      Past Surgical History:   Procedure Laterality Date    COLONOSCOPY      ,normal, due     COLONOSCOPY W/ POLYPECTOMY  2020    adenomatous polyp-3 year follow up recommended    ENHANCE LASER REFRACTIVE NEW PT IN PARAMETERS      ,Lasik eye surgery     Family History   Problem Relation Age of Onset    Hypertension Mother         Hypertension,Lives in [place]    Syncope Mother     Colon Polyps Mother     Other - See Comments Father         Benign prostatic hyperplasia,Lives in [place]    Colon Polyps Sister 34        Colon polyps,Colon polyps diagnosed    No Known Problems Brother     Other - See Comments Brother         No Known Problems,  - MVA    Colon Cancer Maternal Grandmother         Cancer-colon,Colon cancer    Other - See Comments Maternal Grandmother         Alzheimer's    Cancer Paternal Grandmother         Cancer,Liver cancer    No Known Problems Daughter     No Known Problems Daughter     No Known Problems Son     No Known Problems Paternal Half-Brother      Social History     Socioeconomic History    Marital status:      Spouse  name: Brad    Number of children: 3    Years of education: 16    Highest education level: Bachelor's degree (e.g., BA, AB, BS)   Occupational History    Occupation:      Employer: [employer]   Tobacco Use    Smoking status: Never    Smokeless tobacco: Never   Vaping Use    Vaping Use: Never used   Substance and Sexual Activity    Alcohol use: Yes     Alcohol/week: 1.0 standard drink of alcohol     Types: 1 Cans of beer per week    Drug use: No    Sexual activity: Yes     Partners: Male     Birth control/protection: Surgical     Comment: Birth control method: vas   Social History Narrative    .  Works for [employer], .      works for [employer].    Brad  Spouse       Fairha  Daughter born 05/2009  Tariq Son born 2011  Allison daughter 1-2013     Social Determinants of Health     Financial Resource Strain: Low Risk  (1/13/2020)    Overall Financial Resource Strain (CARDIA)     Difficulty of Paying Living Expenses: Not hard at all   Food Insecurity: No Food Insecurity (1/13/2020)    Hunger Vital Sign     Worried About Running Out of Food in the Last Year: Never true     Ran Out of Food in the Last Year: Never true   Transportation Needs: No Transportation Needs (1/13/2020)    PRAPARE - Transportation     Lack of Transportation (Medical): No     Lack of Transportation (Non-Medical): No     Current Outpatient Medications   Medication    augmented betamethasone dipropionate (DIPROLENE-AF) 0.05 % external ointment    Ginkgo Biloba (GINKOBA) 40 MG TABS     No current facility-administered medications for this visit.     No Known Allergies  REVIEW OF SYSTEMS  GENERAL: No fevers or chills. Denies fatigue, recent weight loss.  HEENT: No sinus drainage. No changes with vision or hearing. No difficulty swallowing.   LYMPHATICS:  No swollen nodes in axilla, neck or groin.  CARDIOVASCULAR: Denies chest pain, palpitations and dyspnea on exertion.  PULMONARY: No shortness of breath or cough. No  increase in sputum production.  GI: Denies melena, bright red blood in stools. No hematemesis. No constipation or diarrhea.  : No dysuria or hematuria.  SKIN: No recent rashes or ulcers. No new lesions.  HEMATOLOGY:  No history of easy bruising or bleeding.  ENDOCRINE:  No history of diabetes or thyroid problems.  NEUROLOGY:  No history of seizures or headaches. No motor or sensory changes.    PHYSICAL EXAM  Vitals: /82 (BP Location: Right arm, Patient Position: Sitting, Cuff Size: Adult Regular)   Pulse 80   Temp 98.8  F (37.1  C) (Tympanic)   Resp 16   Wt 67.6 kg (149 lb)   LMP 09/26/2023 (Approximate)   SpO2 98%   BMI 24.99 kg/m    GENERAL: Healthy appearing patient in no acute distress. Pleasant and cooperative with exam and interview.   HEENT:Head-normocephalic. Eyes-no scleral icterus. Nose-no nasal drainage. No lesions. Mouth-oral mucosa pink and moist, no lesions.  NECK: Supple. No thyroid nodules. Trachea midline.  LYMPHATICS:  No cervical, axillary or supraclavicular adenopathy.  SKIN: Pink, warm and dry. No jaundice. No rash. Well healed excision site upper lip, stable less than 4 mm pigmented skin lesion right inner thigh.  NEURO:  Cranial nerves II-XII grossly intact. Alert and oriented.  PSYCH: Appropriate mood and affect.    IMAGING/LAB  I personally reviewed patient's pathology reports/images: from previous excisions

## 2023-10-17 NOTE — Clinical Note
Leslie-can you please call and get her scheduled for screening colonoscopy? Thanks! Laurie Martell MD on 10/17/2023 at 9:23 AM

## 2023-10-17 NOTE — PATIENT INSTRUCTIONS
You should get a call to schedule screening colonoscopy. Keep an eye on the skin spot and if there are changes or you find a new spot-give a call! Call with questions!

## 2023-10-17 NOTE — NURSING NOTE
"Chief Complaint   Patient presents with    RECHECK     Follow up skin check       Initial /82 (BP Location: Right arm, Patient Position: Sitting, Cuff Size: Adult Regular)   Pulse 80   Temp 98.8  F (37.1  C) (Tympanic)   Resp 16   Wt 67.6 kg (149 lb)   LMP 09/26/2023 (Approximate)   SpO2 98%   BMI 24.99 kg/m   Estimated body mass index is 24.99 kg/m  as calculated from the following:    Height as of 4/18/23: 1.645 m (5' 4.75\").    Weight as of this encounter: 67.6 kg (149 lb).  Medication Reconciliation: complete    Verenice Salinas LPN  "

## 2023-11-06 DIAGNOSIS — Z12.11 ENCOUNTER FOR SCREENING COLONOSCOPY: Primary | ICD-10-CM

## 2023-11-06 RX ORDER — POLYETHYLENE GLYCOL 3350, SODIUM CHLORIDE, SODIUM BICARBONATE, POTASSIUM CHLORIDE 420; 11.2; 5.72; 1.48 G/4L; G/4L; G/4L; G/4L
4000 POWDER, FOR SOLUTION ORAL ONCE
Qty: 4000 ML | Refills: 0 | Status: SHIPPED | OUTPATIENT
Start: 2023-12-15 | End: 2023-12-15

## 2023-11-06 RX ORDER — BISACODYL 5 MG/1
TABLET, DELAYED RELEASE ORAL
Qty: 2 TABLET | Refills: 0 | Status: SHIPPED | OUTPATIENT
Start: 2023-12-15 | End: 2023-12-22

## 2023-11-06 NOTE — TELEPHONE ENCOUNTER
Screening Questions for the Scheduling of Screening Colonoscopies   (If Colonoscopy is diagnostic, Provider should review the chart before scheduling.)  Are you younger than 45 or older than 80?  NO  Do you take aspirin or fish oil?  NO (if yes, tell patient to stop 1 week prior to Colonoscopy)  Do you take warfarin (Coumadin), clopidogrel (Plavix), apixaban (Eliquis), dabigatram (Pradaxa), rivaroxaban (Xarelto) or any blood thinner? NO  Do you take Ozempic? NO  Do you use oxygen or a CPAP at home?  NO  Do you have kidney disease? NO  Are you on dialysis? NO  Have you had a stroke or heart attack in the last year? NO  Have you had a stent in your heart or any blood vessel in the last year? NO  Have you had a transplant of any organ? NO  Have you had a colonoscopy or upper endoscopy (EGD) before? YES         When?  2020  Date of scheduled Colonoscopy. 12/22/2023  Provider MARIANO Gonzalez [facility]

## 2023-12-15 RX ORDER — MULTIPLE VITAMINS W/ MINERALS TAB 9MG-400MCG
1 TAB ORAL DAILY
COMMUNITY

## 2023-12-22 ENCOUNTER — ANESTHESIA EVENT (OUTPATIENT)
Dept: SURGERY | Facility: OTHER | Age: 48
End: 2023-12-22
Payer: COMMERCIAL

## 2023-12-22 ENCOUNTER — HOSPITAL ENCOUNTER (OUTPATIENT)
Facility: OTHER | Age: 48
Discharge: HOME | End: 2023-12-22
Attending: SURGERY | Admitting: SURGERY
Payer: COMMERCIAL

## 2023-12-22 ENCOUNTER — ANESTHESIA (OUTPATIENT)
Dept: SURGERY | Facility: OTHER | Age: 48
End: 2023-12-22
Payer: COMMERCIAL

## 2023-12-22 VITALS
RESPIRATION RATE: 16 BRPM | HEART RATE: 78 BPM | SYSTOLIC BLOOD PRESSURE: 118 MMHG | TEMPERATURE: 96.5 F | WEIGHT: 147 LBS | OXYGEN SATURATION: 98 % | BODY MASS INDEX: 24.65 KG/M2 | DIASTOLIC BLOOD PRESSURE: 103 MMHG

## 2023-12-22 PROCEDURE — 258N000003 HC RX IP 258 OP 636: Performed by: SURGERY

## 2023-12-22 PROCEDURE — 45378 DIAGNOSTIC COLONOSCOPY: CPT | Performed by: NURSE ANESTHETIST, CERTIFIED REGISTERED

## 2023-12-22 PROCEDURE — 250N000011 HC RX IP 250 OP 636: Performed by: NURSE ANESTHETIST, CERTIFIED REGISTERED

## 2023-12-22 PROCEDURE — G0121 COLON CA SCRN NOT HI RSK IND: HCPCS | Performed by: SURGERY

## 2023-12-22 PROCEDURE — 250N000009 HC RX 250: Performed by: NURSE ANESTHETIST, CERTIFIED REGISTERED

## 2023-12-22 PROCEDURE — 45378 DIAGNOSTIC COLONOSCOPY: CPT | Performed by: SURGERY

## 2023-12-22 PROCEDURE — 999N000010 HC STATISTIC ANES STAT CODE-CRNA PER MINUTE: Performed by: SURGERY

## 2023-12-22 RX ORDER — ONDANSETRON 2 MG/ML
4 INJECTION INTRAMUSCULAR; INTRAVENOUS
Status: DISCONTINUED | OUTPATIENT
Start: 2023-12-22 | End: 2023-12-22 | Stop reason: HOSPADM

## 2023-12-22 RX ORDER — PROCHLORPERAZINE MALEATE 5 MG
10 TABLET ORAL EVERY 6 HOURS PRN
Status: DISCONTINUED | OUTPATIENT
Start: 2023-12-22 | End: 2023-12-22 | Stop reason: HOSPADM

## 2023-12-22 RX ORDER — NALOXONE HYDROCHLORIDE 0.4 MG/ML
0.4 INJECTION, SOLUTION INTRAMUSCULAR; INTRAVENOUS; SUBCUTANEOUS
Status: DISCONTINUED | OUTPATIENT
Start: 2023-12-22 | End: 2023-12-22 | Stop reason: HOSPADM

## 2023-12-22 RX ORDER — ONDANSETRON 4 MG/1
4 TABLET, ORALLY DISINTEGRATING ORAL EVERY 6 HOURS PRN
Status: DISCONTINUED | OUTPATIENT
Start: 2023-12-22 | End: 2023-12-22 | Stop reason: HOSPADM

## 2023-12-22 RX ORDER — FLUMAZENIL 0.1 MG/ML
0.2 INJECTION, SOLUTION INTRAVENOUS
Status: DISCONTINUED | OUTPATIENT
Start: 2023-12-22 | End: 2023-12-22 | Stop reason: HOSPADM

## 2023-12-22 RX ORDER — PROPOFOL 10 MG/ML
INJECTION, EMULSION INTRAVENOUS CONTINUOUS PRN
Status: DISCONTINUED | OUTPATIENT
Start: 2023-12-22 | End: 2023-12-22

## 2023-12-22 RX ORDER — NALOXONE HYDROCHLORIDE 0.4 MG/ML
0.2 INJECTION, SOLUTION INTRAMUSCULAR; INTRAVENOUS; SUBCUTANEOUS
Status: DISCONTINUED | OUTPATIENT
Start: 2023-12-22 | End: 2023-12-22 | Stop reason: HOSPADM

## 2023-12-22 RX ORDER — LIDOCAINE 40 MG/G
CREAM TOPICAL
Status: DISCONTINUED | OUTPATIENT
Start: 2023-12-22 | End: 2023-12-22 | Stop reason: HOSPADM

## 2023-12-22 RX ORDER — ONDANSETRON 2 MG/ML
4 INJECTION INTRAMUSCULAR; INTRAVENOUS EVERY 6 HOURS PRN
Status: DISCONTINUED | OUTPATIENT
Start: 2023-12-22 | End: 2023-12-22 | Stop reason: HOSPADM

## 2023-12-22 RX ORDER — LIDOCAINE HYDROCHLORIDE 20 MG/ML
INJECTION, SOLUTION INFILTRATION; PERINEURAL PRN
Status: DISCONTINUED | OUTPATIENT
Start: 2023-12-22 | End: 2023-12-22

## 2023-12-22 RX ORDER — PROPOFOL 10 MG/ML
INJECTION, EMULSION INTRAVENOUS PRN
Status: DISCONTINUED | OUTPATIENT
Start: 2023-12-22 | End: 2023-12-22

## 2023-12-22 RX ORDER — SODIUM CHLORIDE, SODIUM LACTATE, POTASSIUM CHLORIDE, CALCIUM CHLORIDE 600; 310; 30; 20 MG/100ML; MG/100ML; MG/100ML; MG/100ML
INJECTION, SOLUTION INTRAVENOUS CONTINUOUS
Status: DISCONTINUED | OUTPATIENT
Start: 2023-12-22 | End: 2023-12-22 | Stop reason: HOSPADM

## 2023-12-22 RX ADMIN — PROPOFOL 150 MCG/KG/MIN: 10 INJECTION, EMULSION INTRAVENOUS at 11:03

## 2023-12-22 RX ADMIN — SODIUM CHLORIDE, POTASSIUM CHLORIDE, SODIUM LACTATE AND CALCIUM CHLORIDE: 600; 310; 30; 20 INJECTION, SOLUTION INTRAVENOUS at 10:37

## 2023-12-22 RX ADMIN — LIDOCAINE HYDROCHLORIDE 40 MG: 20 INJECTION, SOLUTION INFILTRATION; PERINEURAL at 11:03

## 2023-12-22 RX ADMIN — PROPOFOL 100 MG: 10 INJECTION, EMULSION INTRAVENOUS at 11:03

## 2023-12-22 ASSESSMENT — ACTIVITIES OF DAILY LIVING (ADL): ADLS_ACUITY_SCORE: 35

## 2023-12-22 NOTE — H&P
PRE-PROCEDURE NOTE    CHIEF COMPLAINT / REASON FORPROCEDURE:  Need for screening colonoscopy.    PERTINENT HISTORY   Patient with no complaints. Previous colonoscopy 2020-polyps. No diarrhea, constipation, abdominal pain or rectal bleeding. No family history of colon polyps or colon cancer.  Past Medical History:   Diagnosis Date    Cluster headache syndrome, not intractable     No Comments Provided    Psoriasis     No Comments Provided    Vaginal delivery     G4, para 3013 history of SAB 2006.     Past Surgical History:   Procedure Laterality Date    COLONOSCOPY      2010,normal, due 2020    COLONOSCOPY W/ POLYPECTOMY  07/01/2020    adenomatous polyp-3 year follow up recommended    ENHANCE LASER REFRACTIVE NEW PT IN PARAMETERS      2007,Lasik eye surgery     Other:  None  Bleeding tendencies:  No    Relevant Family History:  none    Relevant Social History:  none    A relevant review of systems was performed and was Negative.    ALLERGIES/SENSITIVITIES: No Known Allergies     CURRENTMEDICATIONS:    No current facility-administered medications on file prior to encounter.  garlic 37.5 MG CAPS capsule, Take 1 capsule by mouth daily  Ginkgo Biloba (GINKOBA) 40 MG TABS, Take 1 tablet by mouth daily  multivitamin w/minerals (THERA-VIT-M) tablet, Take 1 tablet by mouth daily  augmented betamethasone dipropionate (DIPROLENE-AF) 0.05 % external ointment, APPLY TOPICALLY TO THE AFFECTED AREAS 2 TIMES DAILY      Current Facility-Administered Medications   Medication    lactated ringers infusion    lidocaine (LMX4) cream    lidocaine 1 % 0.1-1 mL    ondansetron (ZOFRAN) injection 4 mg    sodium chloride (PF) 0.9% PF flush 3 mL    sodium chloride (PF) 0.9% PF flush 3 mL       PRE-SEDATION ASSESSMENT:    /80   Pulse 77   Temp 97.4  F (36.3  C) (Tympanic)   Resp 16   Wt 66.7 kg (147 lb)   SpO2 98%   BMI 24.65 kg/m    Lung Exam:  Normal  Heart Exam:  Normal    Comment(s):      IMPRESSION:  Need for screening  colonoscopy.    PLAN:  I discussed screening colonoscopy with the patient.

## 2023-12-22 NOTE — ANESTHESIA CARE TRANSFER NOTE
Patient: Enid Arredondo    Procedure: Procedure(s):  Colonoscopy       Diagnosis: History of adenomatous polyp of colon [Z86.010]  Diagnosis Additional Information: No value filed.    Anesthesia Type:   MAC     Note:    Oropharynx: oropharynx clear of all foreign objects  Level of Consciousness: awake  Oxygen Supplementation: room air    Independent Airway: airway patency satisfactory and stable  Dentition: dentition unchanged  Vital Signs Stable: post-procedure vital signs reviewed and stable  Report to RN Given: handoff report given  Patient transferred to: Phase II    Handoff Report: Identifed the Patient, Identified the Reponsible Provider, Reviewed the pertinent medical history, Discussed the surgical course, Reviewed Intra-OP anesthesia mangement and issues during anesthesia, Set expectations for post-procedure period and Allowed opportunity for questions and acknowledgement of understanding      Vitals:  Vitals Value Taken Time   BP     Temp     Pulse     Resp     SpO2         Electronically Signed By: MARTHA GALINDO CRNA  December 22, 2023  11:24 AM

## 2023-12-22 NOTE — ANESTHESIA PREPROCEDURE EVALUATION
Anesthesia Pre-Procedure Evaluation    Patient: Enid Arredondo   MRN: [id number] : 1975        Procedure : Procedure(s):  Colonoscopy          Past Medical History:   Diagnosis Date    Cluster headache syndrome, not intractable     No Comments Provided    Psoriasis     No Comments Provided    Vaginal delivery     G4, para 3013 history of SAB .      Past Surgical History:   Procedure Laterality Date    COLONOSCOPY      ,normal, due     COLONOSCOPY W/ POLYPECTOMY  2020    adenomatous polyp-3 year follow up recommended    ENHANCE LASER REFRACTIVE NEW PT IN PARAMETERS      ,Lasik eye surgery      No Known Allergies   Social History     Tobacco Use    Smoking status: Never    Smokeless tobacco: Never   Substance Use Topics    Alcohol use: Yes     Alcohol/week: 1.0 standard drink of alcohol     Types: 1 Cans of beer per week      Wt Readings from Last 1 Encounters:   10/17/23 67.6 kg (149 lb)        Anesthesia Evaluation   Pt has had prior anesthetic.     No history of anesthetic complications       ROS/MED HX  ENT/Pulmonary:  - neg pulmonary ROS     Neurologic: Comment: Cluster headaches       Cardiovascular:       METS/Exercise Tolerance: >4 METS    Hematologic:  - neg hematologic  ROS     Musculoskeletal:  - neg musculoskeletal ROS     GI/Hepatic:     (+)        bowel prep,            Renal/Genitourinary:  - neg Renal ROS     Endo:  - neg endo ROS     Psychiatric/Substance Use:  - neg psychiatric ROS     Infectious Disease:  - neg infectious disease ROS     Malignancy:  - neg malignancy ROS     Other:  - neg other ROS          Physical Exam    Airway        Mallampati: II   TM distance: > 3 FB   Neck ROM: full   Mouth opening: > 3 cm    Respiratory Devices and Support         Dental       (+) Completely normal teeth      Cardiovascular   cardiovascular exam normal       Rhythm and rate: regular and normal     Pulmonary   pulmonary exam normal        breath sounds clear to  "auscultation           OUTSIDE LABS:  CBC:   Lab Results   Component Value Date    HGB 16.1 (H) 08/08/2014    HGB 10.9 (L) 01/09/2013    HCT 48.2 08/08/2014     08/08/2014     BMP:   Lab Results   Component Value Date     (L) 08/08/2014    POTASSIUM 3.7 08/08/2014    CHLORIDE 103 08/08/2014    CO2 26 08/08/2014    BUN 12 08/08/2014    CR 0.89 08/08/2014    GLC 83 08/08/2014     COAGS: No results found for: \"PTT\", \"INR\", \"FIBR\"  POC:   Lab Results   Component Value Date    HCG Negative 07/01/2020     HEPATIC:   Lab Results   Component Value Date    ALBUMIN 4.5 08/08/2014    PROTTOTAL 7.3 08/08/2014    ALKPHOS 56 08/08/2014    BILITOTAL 0.3 08/08/2014     OTHER:   Lab Results   Component Value Date    GIOVANNY 9.5 08/08/2014       Anesthesia Plan    ASA Status:  1    NPO Status:  NPO Appropriate    Anesthesia Type: MAC.              Consents    Anesthesia Plan(s) and associated risks, benefits, and realistic alternatives discussed. Questions answered and patient/representative(s) expressed understanding.     - Discussed: Risks, Benefits and Alternatives for BOTH SEDATION and the PROCEDURE were discussed     - Discussed with:  Patient      - Extended Intubation/Ventilatory Support Discussed: No.      - Patient is DNR/DNI Status: No     Use of blood products discussed: No .     Postoperative Care            Comments:               MARTHA Cannon CRNA    I have reviewed the pertinent notes and labs in the chart from the past 30 days and (re)examined the patient.  Any updates or changes from those notes are reflected in this note.                  "

## 2023-12-22 NOTE — ANESTHESIA POSTPROCEDURE EVALUATION
Patient: Enid Arredondo    Procedure: Procedure(s):  Colonoscopy       Anesthesia Type:  MAC    Note:  Disposition: Outpatient   Postop Pain Control: Uneventful            Sign Out: Well controlled pain   PONV: No   Neuro/Psych: Uneventful            Sign Out: Acceptable/Baseline neuro status   Airway/Respiratory: Uneventful            Sign Out: Acceptable/Baseline resp. status   CV/Hemodynamics: Uneventful            Sign Out: Acceptable CV status; No obvious hypovolemia; No obvious fluid overload   Other NRE: NONE   DID A NON-ROUTINE EVENT OCCUR? No           Last vitals:  Vitals Value Taken Time   /103 12/22/23 1200   Temp 96.5  F (35.8  C) 12/22/23 1124   Pulse 78 12/22/23 1210   Resp 16 12/22/23 1210   SpO2 98 % 12/22/23 1210       Electronically Signed By: MARTHA GALINDO CRNA  December 22, 2023  12:14 PM

## 2023-12-22 NOTE — OP NOTE
PROCEDURE NOTE    SURGEON:Laurie Martell MD.    PRE-OP DIAGNOSIS:  Screening Colonoscopy      POST-OP DIAGNOSIS: normal colon    PROCEDURE:  Screening colonoscopy    ESTIMATED BLOODLOSS: none    COMPLICATIONS:  None    SPECIMEN:  none    ANESTHESIA:  See anesthesia note    INDICATION FOR THE PROCEDURE: The patient is a 48 year old female. The patient has no complaints. I explained to the patient the risks, benefits and alternatives to screening colonoscopy for evaluating the colon for colon polyps and colon cancer. We specifically discussed the risks of bleeding, infection, perforation, potential inability to reach the cecum and the risks of sedation. The patient's questions were answered and the patient wished to proceed. Informed consent paperwork was completed.    PROCEDURE: The patient was taken to the endoscopy suite. Appropriate monitors were attached. The patient was placed in the left lateral decubitus position.Timeout was performed confirming the patient's identity and procedure to be performed. After appropriate sedation was confirmed, digital rectal exam was performed. There was normal tone and no gross abnormality was noted. The lubricated colonoscope was introduced into the anus the colon was insufflated with air. The prep quality was adequate. Under direct visualization the scope was advanced to the cecum. The ileocecal valve was intubated and the terminal ileum inspected. No gross abnormality was noted. The scope was withdrawn back into the cecum. The mucosa of colon was inspected while withdrawing the scope. No abnormalities were noted. The scope was retroflexed in the rectum and the anorectal junction was inspected. No abnormalities were noted. The scope was returned to a neutral position and the colon was decompressed. The scope was removed. The patient tolerated the procedure with no immediately apparent complication. The patient was taken to recovery in stable condition.    FOLLOW UP:RECOMMEND  high fiber diet, follow up: 10 year screening colonoscopy.

## 2023-12-22 NOTE — DISCHARGE INSTRUCTIONS
Procedure you had done: normal colonoscopy  Your health care provider is:  Talisha Hill  Your surgeon is Dr. Laurie Martell.   Please call your health care provider or surgeon at [phone] if:    - you feel you are getting worse or having an increase in problems    - fever greater than 101 degrees  - increasing shortness of breath or chest pain  - any signs of infection (increasing redness, swelling, tenderness, warmth, change in appearance, or  increased drainage)  - blood in your urine or stool  - coughing or vomiting blood  - nausea (upset stomach) and vomiting and/or diarrhea that will not stop  - severe pain that is not relieved by medicine, rest or ice  You have had medications for sedation. Please be aware that this can cause drowsiness and impaired judgment for up to 24 hours after your procedure. Do not drive, operate power tools or drink alcohol for 24 hours.  If samples were taken-you will get a phone call and a letter with your results in the next 7-10 days. If you don't get results, please call and let us know!

## 2023-12-22 NOTE — OR NURSING
Patient was discharged home via ambulatory.   Discharge instructions were reviewed with patient . And she verbalized understanding.   Prescriptions: none

## 2024-09-28 DIAGNOSIS — L40.9 PSORIASIS: ICD-10-CM

## 2024-09-29 RX ORDER — BETAMETHASONE DIPROPIONATE 0.5 MG/G
OINTMENT, AUGMENTED TOPICAL
Qty: 50 G | Refills: 11 | Status: SHIPPED | OUTPATIENT
Start: 2024-09-29

## 2025-07-11 PROBLEM — F43.23 ADJUSTMENT DISORDER WITH MIXED ANXIETY AND DEPRESSED MOOD: Status: ACTIVE | Noted: 2025-07-11

## 2025-07-11 PROBLEM — Z63.0 RELATIONSHIP PROBLEM BETWEEN PARTNERS: Status: ACTIVE | Noted: 2025-07-11

## 2025-07-11 PROBLEM — N95.2 ATROPHIC VAGINITIS: Status: ACTIVE | Noted: 2025-07-11

## 2025-07-11 PROBLEM — N95.1 PERIMENOPAUSAL VASOMOTOR SYMPTOMS: Status: ACTIVE | Noted: 2025-07-11

## 2025-07-13 ENCOUNTER — HEALTH MAINTENANCE LETTER (OUTPATIENT)
Age: 50
End: 2025-07-13

## 2025-07-31 DIAGNOSIS — N95.1 PERIMENOPAUSAL VASOMOTOR SYMPTOMS: ICD-10-CM

## 2025-08-06 RX ORDER — ESTRADIOL 0.03 MG/D
FILM, EXTENDED RELEASE TRANSDERMAL
Qty: 24 PATCH | Refills: 2 | Status: SHIPPED | OUTPATIENT
Start: 2025-08-06

## 2025-08-22 ENCOUNTER — HOSPITAL ENCOUNTER (OUTPATIENT)
Dept: MAMMOGRAPHY | Facility: OTHER | Age: 50
Discharge: HOME | End: 2025-08-22
Attending: FAMILY MEDICINE | Admitting: FAMILY MEDICINE
Payer: COMMERCIAL

## 2025-08-22 DIAGNOSIS — Z12.31 ENCOUNTER FOR SCREENING MAMMOGRAM FOR BREAST CANCER: ICD-10-CM

## 2025-08-22 PROCEDURE — 77067 SCR MAMMO BI INCL CAD: CPT | Mod: 26 | Performed by: STUDENT IN AN ORGANIZED HEALTH CARE EDUCATION/TRAINING PROGRAM

## 2025-08-22 PROCEDURE — 77063 BREAST TOMOSYNTHESIS BI: CPT

## 2025-08-22 PROCEDURE — 77063 BREAST TOMOSYNTHESIS BI: CPT | Mod: 26 | Performed by: STUDENT IN AN ORGANIZED HEALTH CARE EDUCATION/TRAINING PROGRAM

## (undated) DEVICE — SOL WATER 1500ML

## (undated) DEVICE — ENDO BRUSH CHANNEL MASTER CLEANING 2-4.2MM BW-412T

## (undated) DEVICE — TUBING SUCTION 10'X3/16" N510

## (undated) DEVICE — Device

## (undated) DEVICE — ENDO KIT COMPLIANCE DYKENDOCMPLY

## (undated) DEVICE — ENDO TRAP POLYP E-TRAP 00711099

## (undated) DEVICE — ENDO SNARE EXACTO COLD 9MM LOOP 2.4MMX230CM 00711115

## (undated) DEVICE — SUCTION MANIFOLD NEPTUNE 2 SYS 4 PORT 0702-020-000

## (undated) RX ORDER — PROPOFOL 10 MG/ML
INJECTION, EMULSION INTRAVENOUS
Status: DISPENSED
Start: 2023-12-22